# Patient Record
Sex: FEMALE | Race: WHITE | NOT HISPANIC OR LATINO | URBAN - METROPOLITAN AREA
[De-identification: names, ages, dates, MRNs, and addresses within clinical notes are randomized per-mention and may not be internally consistent; named-entity substitution may affect disease eponyms.]

---

## 2015-08-24 RX ORDER — METOPROLOL TARTRATE 50 MG
0.5 TABLET ORAL
Qty: 0 | Refills: 0 | COMMUNITY
Start: 2015-08-24

## 2015-08-24 RX ORDER — METOPROLOL TARTRATE 50 MG
1 TABLET ORAL
Qty: 0 | Refills: 0 | COMMUNITY
Start: 2015-08-24

## 2017-01-04 ENCOUNTER — INPATIENT (INPATIENT)
Facility: HOSPITAL | Age: 82
LOS: 3 days | Discharge: HOME | DRG: 291 | End: 2017-01-08
Attending: INTERNAL MEDICINE | Admitting: INTERNAL MEDICINE
Payer: MEDICARE

## 2017-01-04 VITALS
SYSTOLIC BLOOD PRESSURE: 163 MMHG | OXYGEN SATURATION: 91 % | HEART RATE: 76 BPM | HEIGHT: 62 IN | RESPIRATION RATE: 31 BRPM | WEIGHT: 110.01 LBS | DIASTOLIC BLOOD PRESSURE: 83 MMHG

## 2017-01-04 DIAGNOSIS — I48.0 PAROXYSMAL ATRIAL FIBRILLATION: ICD-10-CM

## 2017-01-04 DIAGNOSIS — I50.9 HEART FAILURE, UNSPECIFIED: ICD-10-CM

## 2017-01-04 DIAGNOSIS — Z41.8 ENCOUNTER FOR OTHER PROCEDURES FOR PURPOSES OTHER THAN REMEDYING HEALTH STATE: ICD-10-CM

## 2017-01-04 DIAGNOSIS — J81.0 ACUTE PULMONARY EDEMA: ICD-10-CM

## 2017-01-04 DIAGNOSIS — I25.10 ATHEROSCLEROTIC HEART DISEASE OF NATIVE CORONARY ARTERY WITHOUT ANGINA PECTORIS: ICD-10-CM

## 2017-01-04 DIAGNOSIS — I10 ESSENTIAL (PRIMARY) HYPERTENSION: ICD-10-CM

## 2017-01-04 DIAGNOSIS — N18.3 CHRONIC KIDNEY DISEASE, STAGE 3 (MODERATE): ICD-10-CM

## 2017-01-04 LAB
ALBUMIN SERPL ELPH-MCNC: 3.5 G/DL — SIGNIFICANT CHANGE UP (ref 3.5–5)
ALP SERPL-CCNC: 112 U/L — SIGNIFICANT CHANGE UP (ref 40–120)
ALT FLD-CCNC: 39 U/L DA — SIGNIFICANT CHANGE UP (ref 10–60)
ANION GAP SERPL CALC-SCNC: 10 MMOL/L — SIGNIFICANT CHANGE UP (ref 5–17)
APPEARANCE UR: CLEAR — SIGNIFICANT CHANGE UP
APTT BLD: 29.4 SEC — SIGNIFICANT CHANGE UP (ref 27.5–37.4)
AST SERPL-CCNC: 22 U/L — SIGNIFICANT CHANGE UP (ref 10–40)
BASE EXCESS BLDA CALC-SCNC: -2.6 MMOL/L — LOW (ref -2–2)
BASOPHILS # BLD AUTO: 0.1 K/UL — SIGNIFICANT CHANGE UP (ref 0–0.2)
BASOPHILS NFR BLD AUTO: 0.5 % — SIGNIFICANT CHANGE UP (ref 0–2)
BILIRUB SERPL-MCNC: 0.4 MG/DL — SIGNIFICANT CHANGE UP (ref 0.2–1.2)
BILIRUB UR-MCNC: NEGATIVE — SIGNIFICANT CHANGE UP
BLOOD GAS COMMENTS ARTERIAL: SIGNIFICANT CHANGE UP
BUN SERPL-MCNC: 49 MG/DL — HIGH (ref 7–18)
CALCIUM SERPL-MCNC: 9 MG/DL — SIGNIFICANT CHANGE UP (ref 8.4–10.5)
CHLORIDE SERPL-SCNC: 109 MMOL/L — HIGH (ref 96–108)
CK MB BLD-MCNC: 3 % — SIGNIFICANT CHANGE UP (ref 0–3.5)
CK MB BLD-MCNC: <1.5 % — SIGNIFICANT CHANGE UP (ref 0–3.5)
CK MB CFR SERPL CALC: 2.1 NG/ML — SIGNIFICANT CHANGE UP (ref 0–3.6)
CK MB CFR SERPL CALC: <1 NG/ML — SIGNIFICANT CHANGE UP (ref 0–3.6)
CK SERPL-CCNC: 66 U/L — SIGNIFICANT CHANGE UP (ref 21–215)
CK SERPL-CCNC: 70 U/L — SIGNIFICANT CHANGE UP (ref 21–215)
CO2 SERPL-SCNC: 25 MMOL/L — SIGNIFICANT CHANGE UP (ref 22–31)
COLOR SPEC: YELLOW — SIGNIFICANT CHANGE UP
CREAT SERPL-MCNC: 2.11 MG/DL — HIGH (ref 0.5–1.3)
DIFF PNL FLD: NEGATIVE — SIGNIFICANT CHANGE UP
EOSINOPHIL # BLD AUTO: 0.3 K/UL — SIGNIFICANT CHANGE UP (ref 0–0.5)
EOSINOPHIL NFR BLD AUTO: 1.7 % — SIGNIFICANT CHANGE UP (ref 0–6)
GLUCOSE SERPL-MCNC: 237 MG/DL — HIGH (ref 70–99)
GLUCOSE UR QL: NEGATIVE — SIGNIFICANT CHANGE UP
HBA1C BLD-MCNC: 5.9 % — HIGH (ref 4–5.6)
HCO3 BLDA-SCNC: 23 MMOL/L — SIGNIFICANT CHANGE UP (ref 23–27)
HCT VFR BLD CALC: 44.9 % — SIGNIFICANT CHANGE UP (ref 34.5–45)
HGB BLD-MCNC: 13.6 G/DL — SIGNIFICANT CHANGE UP (ref 11.5–15.5)
HOROWITZ INDEX BLDA+IHG-RTO: SIGNIFICANT CHANGE UP
INR BLD: 0.94 RATIO — SIGNIFICANT CHANGE UP (ref 0.88–1.16)
KETONES UR-MCNC: NEGATIVE — SIGNIFICANT CHANGE UP
LEUKOCYTE ESTERASE UR-ACNC: NEGATIVE — SIGNIFICANT CHANGE UP
LYMPHOCYTES # BLD AUTO: 28 % — SIGNIFICANT CHANGE UP (ref 13–44)
LYMPHOCYTES # BLD AUTO: 4.7 K/UL — HIGH (ref 1–3.3)
MCHC RBC-ENTMCNC: 29.3 PG — SIGNIFICANT CHANGE UP (ref 27–34)
MCHC RBC-ENTMCNC: 30.4 GM/DL — LOW (ref 32–36)
MCV RBC AUTO: 96.4 FL — SIGNIFICANT CHANGE UP (ref 80–100)
MONOCYTES # BLD AUTO: 0.8 K/UL — SIGNIFICANT CHANGE UP (ref 0–0.9)
MONOCYTES NFR BLD AUTO: 4.9 % — SIGNIFICANT CHANGE UP (ref 2–14)
NEUTROPHILS # BLD AUTO: 10.8 K/UL — HIGH (ref 1.8–7.4)
NEUTROPHILS NFR BLD AUTO: 64.9 % — SIGNIFICANT CHANGE UP (ref 43–77)
NITRITE UR-MCNC: NEGATIVE — SIGNIFICANT CHANGE UP
NT-PROBNP SERPL-SCNC: 5213 PG/ML — HIGH (ref 0–450)
PCO2 BLDA: 46 MMHG — SIGNIFICANT CHANGE UP (ref 32–46)
PH BLDA: 7.32 — LOW (ref 7.35–7.45)
PH UR: 5 — SIGNIFICANT CHANGE UP (ref 4.8–8)
PLATELET # BLD AUTO: 338 K/UL — SIGNIFICANT CHANGE UP (ref 150–400)
PO2 BLDA: 63 MMHG — LOW (ref 74–108)
POTASSIUM SERPL-MCNC: 3.3 MMOL/L — LOW (ref 3.5–5.3)
POTASSIUM SERPL-SCNC: 3.3 MMOL/L — LOW (ref 3.5–5.3)
PROT SERPL-MCNC: 8.2 G/DL — SIGNIFICANT CHANGE UP (ref 6–8.3)
PROT UR-MCNC: 15
PROTHROM AB SERPL-ACNC: 10.5 SEC — SIGNIFICANT CHANGE UP (ref 10–13.1)
RBC # BLD: 4.66 M/UL — SIGNIFICANT CHANGE UP (ref 3.8–5.2)
RBC # FLD: 15.7 % — HIGH (ref 10.3–14.5)
SAO2 % BLDA: 89 % — LOW (ref 92–96)
SODIUM SERPL-SCNC: 144 MMOL/L — SIGNIFICANT CHANGE UP (ref 135–145)
SP GR SPEC: 1.01 — SIGNIFICANT CHANGE UP (ref 1.01–1.02)
TROPONIN I SERPL-MCNC: 0.1 NG/ML — HIGH (ref 0–0.04)
TROPONIN I SERPL-MCNC: 0.18 NG/ML — HIGH (ref 0–0.04)
UROBILINOGEN FLD QL: NEGATIVE — SIGNIFICANT CHANGE UP
WBC # BLD: 16.7 K/UL — HIGH (ref 3.8–10.5)
WBC # FLD AUTO: 16.7 K/UL — HIGH (ref 3.8–10.5)

## 2017-01-04 PROCEDURE — 99291 CRITICAL CARE FIRST HOUR: CPT

## 2017-01-04 PROCEDURE — 71010: CPT | Mod: 26

## 2017-01-04 RX ORDER — CLOPIDOGREL BISULFATE 75 MG/1
1 TABLET, FILM COATED ORAL
Qty: 0 | Refills: 0 | COMMUNITY

## 2017-01-04 RX ORDER — INSULIN LISPRO 100/ML
VIAL (ML) SUBCUTANEOUS EVERY 6 HOURS
Qty: 0 | Refills: 0 | Status: DISCONTINUED | OUTPATIENT
Start: 2017-01-04 | End: 2017-01-04

## 2017-01-04 RX ORDER — HEPARIN SODIUM 5000 [USP'U]/ML
5000 INJECTION INTRAVENOUS; SUBCUTANEOUS EVERY 8 HOURS
Qty: 0 | Refills: 0 | Status: DISCONTINUED | OUTPATIENT
Start: 2017-01-04 | End: 2017-01-08

## 2017-01-04 RX ORDER — METOPROLOL TARTRATE 50 MG
25 TABLET ORAL THREE TIMES A DAY
Qty: 0 | Refills: 0 | Status: DISCONTINUED | OUTPATIENT
Start: 2017-01-04 | End: 2017-01-08

## 2017-01-04 RX ORDER — ATORVASTATIN CALCIUM 80 MG/1
40 TABLET, FILM COATED ORAL AT BEDTIME
Qty: 0 | Refills: 0 | Status: DISCONTINUED | OUTPATIENT
Start: 2017-01-04 | End: 2017-01-08

## 2017-01-04 RX ORDER — FUROSEMIDE 40 MG
80 TABLET ORAL ONCE
Qty: 0 | Refills: 0 | Status: COMPLETED | OUTPATIENT
Start: 2017-01-04 | End: 2017-01-04

## 2017-01-04 RX ORDER — ASPIRIN/CALCIUM CARB/MAGNESIUM 324 MG
81 TABLET ORAL DAILY
Qty: 0 | Refills: 0 | Status: DISCONTINUED | OUTPATIENT
Start: 2017-01-04 | End: 2017-01-08

## 2017-01-04 RX ORDER — CLOPIDOGREL BISULFATE 75 MG/1
75 TABLET, FILM COATED ORAL DAILY
Qty: 0 | Refills: 0 | Status: DISCONTINUED | OUTPATIENT
Start: 2017-01-04 | End: 2017-01-08

## 2017-01-04 RX ORDER — SODIUM CHLORIDE 9 MG/ML
3 INJECTION INTRAMUSCULAR; INTRAVENOUS; SUBCUTANEOUS ONCE
Qty: 0 | Refills: 0 | Status: COMPLETED | OUTPATIENT
Start: 2017-01-04 | End: 2017-01-04

## 2017-01-04 RX ORDER — POTASSIUM CHLORIDE 20 MEQ
10 PACKET (EA) ORAL
Qty: 0 | Refills: 0 | Status: DISCONTINUED | OUTPATIENT
Start: 2017-01-04 | End: 2017-01-08

## 2017-01-04 RX ORDER — PANTOPRAZOLE SODIUM 20 MG/1
40 TABLET, DELAYED RELEASE ORAL
Qty: 0 | Refills: 0 | Status: DISCONTINUED | OUTPATIENT
Start: 2017-01-04 | End: 2017-01-08

## 2017-01-04 RX ORDER — AMIODARONE HYDROCHLORIDE 400 MG/1
200 TABLET ORAL DAILY
Qty: 0 | Refills: 0 | Status: DISCONTINUED | OUTPATIENT
Start: 2017-01-04 | End: 2017-01-08

## 2017-01-04 RX ORDER — PIPERACILLIN AND TAZOBACTAM 4; .5 G/20ML; G/20ML
3.38 INJECTION, POWDER, LYOPHILIZED, FOR SOLUTION INTRAVENOUS ONCE
Qty: 0 | Refills: 0 | Status: COMPLETED | OUTPATIENT
Start: 2017-01-04 | End: 2017-01-04

## 2017-01-04 RX ORDER — FUROSEMIDE 40 MG
40 TABLET ORAL EVERY 12 HOURS
Qty: 0 | Refills: 0 | Status: DISCONTINUED | OUTPATIENT
Start: 2017-01-04 | End: 2017-01-08

## 2017-01-04 RX ADMIN — Medication 25 MILLIGRAM(S): at 15:19

## 2017-01-04 RX ADMIN — PIPERACILLIN AND TAZOBACTAM 200 GRAM(S): 4; .5 INJECTION, POWDER, LYOPHILIZED, FOR SOLUTION INTRAVENOUS at 11:46

## 2017-01-04 RX ADMIN — Medication 81 MILLIGRAM(S): at 21:44

## 2017-01-04 RX ADMIN — ATORVASTATIN CALCIUM 40 MILLIGRAM(S): 80 TABLET, FILM COATED ORAL at 21:45

## 2017-01-04 RX ADMIN — AMIODARONE HYDROCHLORIDE 200 MILLIGRAM(S): 400 TABLET ORAL at 15:19

## 2017-01-04 RX ADMIN — Medication 80 MILLIGRAM(S): at 10:36

## 2017-01-04 RX ADMIN — Medication 40 MILLIGRAM(S): at 17:36

## 2017-01-04 RX ADMIN — HEPARIN SODIUM 5000 UNIT(S): 5000 INJECTION INTRAVENOUS; SUBCUTANEOUS at 21:45

## 2017-01-04 RX ADMIN — CLOPIDOGREL BISULFATE 75 MILLIGRAM(S): 75 TABLET, FILM COATED ORAL at 21:44

## 2017-01-04 RX ADMIN — SODIUM CHLORIDE 3 MILLILITER(S): 9 INJECTION INTRAMUSCULAR; INTRAVENOUS; SUBCUTANEOUS at 10:37

## 2017-01-04 RX ADMIN — PANTOPRAZOLE SODIUM 40 MILLIGRAM(S): 20 TABLET, DELAYED RELEASE ORAL at 21:44

## 2017-01-04 RX ADMIN — Medication 100 MILLIEQUIVALENT(S): at 15:19

## 2017-01-04 RX ADMIN — Medication 1.25 MILLIGRAM(S): at 10:36

## 2017-01-04 NOTE — ED PROVIDER NOTE - PMH
CAD (coronary artery disease)  s/p 4 stents  CAD (coronary artery disease)  S/p stent x 4 (remote)  CHF (congestive heart failure)  hfref  CHF, chronic  EF 40-45%  CKD (chronic kidney disease), stage 3 (moderate)    HTN (hypertension)    HTN (hypertension)    MI (myocardial infarction)    Osteoarthritis    Osteoarthritis    Paroxysmal atrial fibrillation    Paroxysmal atrial fibrillation

## 2017-01-04 NOTE — ED PROVIDER NOTE - PROGRESS NOTE DETAILS
Pt was given Lasix 80 mg IVP, enalapril 1.25 mg IVP, pt was placed on Nitro drip. Pt was also placed on Bipap. Pt improved on Bipap and Nitro drip. ICU and Dr. Carrillo was consulted. After ICU and Dr. Carrillo evalution, advised to down grade to Tele.

## 2017-01-04 NOTE — ED PROVIDER NOTE - MEDICAL DECISION MAKING DETAILS
Pt presents to the ED BIBA for respiratory distress. Respiratory team was called. Plan to obtain basic labs, UA, cultures, CXR, and troponin. Ollie treat with Lasix, Vasotec and reassess. Plan to admit

## 2017-01-04 NOTE — ED PROVIDER NOTE - NS ED MD SCRIBE ATTENDING SCRIBE SECTIONS
PHYSICAL EXAM/PAST MEDICAL/SURGICAL/SOCIAL HISTORY/HISTORY OF PRESENT ILLNESS/REVIEW OF SYSTEMS/DISPOSITION/VITAL SIGNS( Pullset)

## 2017-01-04 NOTE — H&P ADULT. - HISTORY OF PRESENT ILLNESS
Patient is an 84 year-old female from home (lives alone) with PMH of systolic heart failure, MI, CAD (s/p 5 stents, latest 11/2016), atrial fibrillation, CKD III, GERD who presents with sudden onset shortness of breath that began at 7:30am today. She denies previous symptoms. She denies headache, chest pain, nausea, vomiting, diarrhea, abdominal pain, recent illness, fevers. At baseline, she sleeps with 2 pillows and has chronic leg swelling. She was admitted to [facility] in November, 2016 for acute pulmonary edema for which she required intubation and ICU stay for acute pulmonary edema secondary to cardiac ischemia. During that admission, she was stabilized and sent to telemetry floor after which she was transferred to [facility] for cardiac catheterization. Stent was place din left circumflex artery. TTE at that time showed EF 20-25%. Life vest was not necessary as per primary cardiologist. Patient was amiodarone loaded for atrial fibrillation and started on ACEi.

## 2017-01-04 NOTE — H&P ADULT. - FAMILY HISTORY
No pertinent family history in first degree relatives     No pertinent family history in first degree relatives

## 2017-01-04 NOTE — ED ADULT NURSE REASSESSMENT NOTE - NS ED NURSE REASSESS COMMENT FT1
Patient received from SILVANO Thayer. Patient alert, responsive, resting in stretcher, on bipap. Bipap S/T settings, rate 12, O2 100%, IPAP 14cm H20. Patient on wall cardiac monitor, sinus bradycardia, pulse 48, MD aware. Patient states breathing is improved at this time. No use of accessory muscles. Skin is warm and dry. Awaiting disposition.

## 2017-01-04 NOTE — ED PROVIDER NOTE - CRITICAL CARE PROVIDED
interpretation of diagnostic studies/direct patient care (not related to procedure)/documentation/consultation with other physicians/additional history taking

## 2017-01-04 NOTE — H&P ADULT. - PROBLEM SELECTOR PLAN 1
Pt has SOB associated with chest pressure and palpitations and hypoxia likely sec to Acute pulm edema.  s/p lasix IV 40mg BID in ED, continue the same  T1 neg, f/u T2 and T3  Tele monitoring  r/o ACS given CP and palpitations  ASA, Statin, lopressor  pt placed on Bipap as hypoxic  Monitor I/O, Daily weights  f/u CXR in AM  F/U hba1c, lipids, TSH  Cardio Dr. Carrillo - patient has shortness of breath with associated chest pressure, hypoxia due to pulmonary edema secondary to acute on chronic systolic heart failure exacerbation, echocardiogram in 11/2016 showed EF 20-25%  - placed on BIPAP, ABG showed hypoxia at 100% FiO2; chest xray showed pulmonary edema   - s/p 80 mg IV lasix in the ED, dooley placed, patient diuresing well   - continue lasix IV 40mg twice daily  - patient has symptomatically improved and is saturating well, not tachypneic, will decrease FiO2 as tolerated   - monitor strict intake/output and daily weights   - given chest pressure, will rule out ACS; T1 0.103, follow up 2 more sets cardiac enzymes  - continue aspirin, statin, lopressor   - continue enalapril   - follow up repeat chest xray tomorrow morning   - cardiology- Dr. Carrillo

## 2017-01-04 NOTE — ED PROVIDER NOTE - OBJECTIVE STATEMENT
83 y/o F pt with PMHx of CHF, stents x 5 last stent 2 weeks ago and MI presents to the ED c/o SOB x 0800 today. Pt was in obvious respiratory distress when EMS arrived with an O2 stat of 65 on room air. Pt's O2 stat increased to 85 with CPAP in the field. Per EMS, pt took 2 nitros on own. Pt was last intubated 4 weeks ago. Unable to obtain further Hx from pt due to need for intervention. Pt has intolerance to Zithromax and azithromycin.

## 2017-01-04 NOTE — H&P ADULT. - ATTENDING COMMENTS
Assessment:  · Assessment	  Patient is an 84 year-old female from home with PMH of systolic heart failure, MI, CAD with 5 stents, atrial fibrillation, CKD III, GERD who presents with sudden onset shortness of breath that began at 7:30am today and is admitted to the MICU for acute pulmonary edema requiring BIPAP.       Problem/Plan - 1:  ·  Problem: Acute pulmonary edema.  Plan: - patient has shortness of breath with associated chest pressure, hypoxia due to pulmonary edema secondary to acute on chronic systolic heart failure exacerbation, echocardiogram in 11/2016 showed EF 20-25%  - placed on BIPAP, ABG showed hypoxia at 100% FiO2; chest xray showed pulmonary edema   - s/p 80 mg IV lasix in the ED, dooley placed, patient diuresing well   - continue lasix IV 40mg twice daily  - patient has symptomatically improved and is saturating well, not tachypneic, will decrease FiO2 as tolerated   - monitor strict intake/output and daily weights   - given chest pressure, will rule out ACS; T1 0.103, follow up 2 more sets cardiac enzymes  - continue aspirin, statin, lopressor   - continue enalapril   - follow up repeat chest xray tomorrow morning   - cardiology- Dr. Carrillo.     Problem/Plan - 2:  ·  Problem: Paroxysmal atrial fibrillation.  Plan: - continue amiodarone for rhythm control, lopressor for rate control   - no longer on xarelto as per primary cardiologist  - CHADS 2 Vasc 5+;  continue plavix and aspirin.     Problem/Plan - 3:  ·  Problem: CKD (chronic kidney disease), stage 3 (moderate).  Plan: - patient has CKD stage III, baseline creatinine 2; serum creatinine today is 2.11, not significantly changes from baseline  - monitor kidney function  - anticipate mild increase in creatinine due to IV lasix.     Problem/Plan - 4:  ·  Problem: Essential hypertension.  Plan: - continue enalapril, lopressor as per home medication regimen  - monitor BP.     Problem/Plan - 5:  ·  Problem: CAD (coronary artery disease).  Plan: - with 5 total stents, most recent in 11/2016  - continue aspirin, plavix, statin, beta blocker.     Problem/Plan - 6:  Problem: Need for prophylactic measure. Plan: - heparin subcutaneous.

## 2017-01-04 NOTE — ED ADULT NURSE NOTE - OBJECTIVE STATEMENT
pt bought in by the notification pt on the bipap pt has the distended jugular vein pt disphoretic pt has sob

## 2017-01-04 NOTE — H&P ADULT. - PROBLEM SELECTOR PLAN 4
pt has CKD stage 3 baseline 2, craetinine 2.11 close to baseline  monitor creatinine - continue enalapril, lopressor as per home medication regimen  - monitor BP

## 2017-01-04 NOTE — H&P ADULT. - PROBLEM SELECTOR PLAN 2
Pt has SOB associated with chest pressure and palpitations and hypoxia likely sec to acute on chronic combined CHF exacerbation  s/p lasix IV 40mg BID in ED, continue the same  T1 neg, f/u T2 and T3  Tele monitoring  r/o ACS given CP and palpitations  ASA, Statin  pt placed on Bipap as hypoxic  Monitor I/O, Daily weights  f/u CXR in AM  F/U hba1c, lipids, TSH  Cardio Dr. Carrillo  ECHO in Nov 2016 EF 20-25% - continue amiodarone for rhythm control, lopressor for rate control   - no longer on xarelto as per primary cardiologist  - CHADS 2 Vasc 5+;  continue plavix and aspirin

## 2017-01-04 NOTE — H&P ADULT. - PROBLEM SELECTOR PROBLEM 2
Congestive heart failure, unspecified congestive heart failure chronicity, unspecified congestive heart failure type Paroxysmal atrial fibrillation

## 2017-01-04 NOTE — H&P ADULT. - PROBLEM SELECTOR PLAN 5
Will give vasotec and lopressor  monitor bp - with 5 total stents, most recent in 11/2016  - continue aspirin, plavix, statin, beta blocker

## 2017-01-04 NOTE — H&P ADULT. - ASSESSMENT
Patient is an 84 year-old female from home (lives alone) with PMH of systolic heart failure, MI, CAD (s/p 5 stents, latest 11/2016), atrial fibrillation, CKD III, GERD who presents with sudden onset shortness of breath that began at 7:30am today. She denies previous symptoms. She denies headache, chest pain, nausea, vomiting, diarrhea, abdominal pain, recent illness, fevers. At baseline, she sleeps with 2 pillows and has chronic leg swelling. She was admitted to [facility] in November, 2016 for acute pulmonary edema for which she required intubation and ICU stay for acute pulmonary edema secondary to cardiac ischemia. During that admission, she was stabilized and sent to telemetry floor after which she was transferred to [facility] for cardiac catheterization. Stent was place din left circumflex artery. TTE at that time showed EF 20-25%. Life vest was not necessary as per primary cardiologist. Patient was amiodarone loaded for atrial fibrillation and started on ACEi. Patient is an 84 year-old female from home with PMH of systolic heart failure, MI, CAD with 5 stents, atrial fibrillation, CKD III, GERD who presents with sudden onset shortness of breath that began at 7:30am today and is admitted to the MICU for acute pulmonary edema requiring BIPAP.

## 2017-01-04 NOTE — H&P ADULT. - PROBLEM SELECTOR PLAN 3
Rate controlled on amiodarone and metoprolol  not on any AC  continue plavix and ASA - patient has CKD stage III, baseline creatinine 2; serum creatinine today is 2.11, not significantly changes from baseline  - monitor kidney function  - expect mild increase in creatinine due to IV lasix - patient has CKD stage III, baseline creatinine 2; serum creatinine today is 2.11, not significantly changes from baseline  - monitor kidney function  - anticipate mild increase in creatinine due to IV lasix

## 2017-01-05 LAB
ANION GAP SERPL CALC-SCNC: 10 MMOL/L — SIGNIFICANT CHANGE UP (ref 5–17)
ANION GAP SERPL CALC-SCNC: 11 MMOL/L — SIGNIFICANT CHANGE UP (ref 5–17)
BASOPHILS # BLD AUTO: 0.1 K/UL — SIGNIFICANT CHANGE UP (ref 0–0.2)
BASOPHILS NFR BLD AUTO: 0.5 % — SIGNIFICANT CHANGE UP (ref 0–2)
BUN SERPL-MCNC: 39 MG/DL — HIGH (ref 7–18)
BUN SERPL-MCNC: 42 MG/DL — HIGH (ref 7–18)
CALCIUM SERPL-MCNC: 8.6 MG/DL — SIGNIFICANT CHANGE UP (ref 8.4–10.5)
CALCIUM SERPL-MCNC: 8.9 MG/DL — SIGNIFICANT CHANGE UP (ref 8.4–10.5)
CHLORIDE SERPL-SCNC: 107 MMOL/L — SIGNIFICANT CHANGE UP (ref 96–108)
CHLORIDE SERPL-SCNC: 111 MMOL/L — HIGH (ref 96–108)
CHOLEST SERPL-MCNC: <50 MG/DL — SIGNIFICANT CHANGE UP (ref 10–199)
CO2 SERPL-SCNC: 26 MMOL/L — SIGNIFICANT CHANGE UP (ref 22–31)
CO2 SERPL-SCNC: 27 MMOL/L — SIGNIFICANT CHANGE UP (ref 22–31)
CREAT SERPL-MCNC: 1.39 MG/DL — HIGH (ref 0.5–1.3)
CREAT SERPL-MCNC: 1.41 MG/DL — HIGH (ref 0.5–1.3)
CULTURE RESULTS: NO GROWTH — SIGNIFICANT CHANGE UP
EOSINOPHIL # BLD AUTO: 0.1 K/UL — SIGNIFICANT CHANGE UP (ref 0–0.5)
EOSINOPHIL NFR BLD AUTO: 0.7 % — SIGNIFICANT CHANGE UP (ref 0–6)
GLUCOSE SERPL-MCNC: 117 MG/DL — HIGH (ref 70–99)
GLUCOSE SERPL-MCNC: 91 MG/DL — SIGNIFICANT CHANGE UP (ref 70–99)
HCT VFR BLD CALC: 40.2 % — SIGNIFICANT CHANGE UP (ref 34.5–45)
HDLC SERPL-MCNC: <10 MG/DL — LOW (ref 40–125)
HGB BLD-MCNC: 12.4 G/DL — SIGNIFICANT CHANGE UP (ref 11.5–15.5)
LIPID PNL WITH DIRECT LDL SERPL: SIGNIFICANT CHANGE UP MG/DL
LYMPHOCYTES # BLD AUTO: 1.3 K/UL — SIGNIFICANT CHANGE UP (ref 1–3.3)
LYMPHOCYTES # BLD AUTO: 13.3 % — SIGNIFICANT CHANGE UP (ref 13–44)
MAGNESIUM SERPL-MCNC: 2.4 MG/DL — SIGNIFICANT CHANGE UP (ref 1.8–2.4)
MCHC RBC-ENTMCNC: 29.2 PG — SIGNIFICANT CHANGE UP (ref 27–34)
MCHC RBC-ENTMCNC: 31 GM/DL — LOW (ref 32–36)
MCV RBC AUTO: 94.3 FL — SIGNIFICANT CHANGE UP (ref 80–100)
MONOCYTES # BLD AUTO: 0.9 K/UL — SIGNIFICANT CHANGE UP (ref 0–0.9)
MONOCYTES NFR BLD AUTO: 8.9 % — SIGNIFICANT CHANGE UP (ref 2–14)
NEUTROPHILS # BLD AUTO: 7.4 K/UL — SIGNIFICANT CHANGE UP (ref 1.8–7.4)
NEUTROPHILS NFR BLD AUTO: 76.6 % — SIGNIFICANT CHANGE UP (ref 43–77)
PHOSPHATE SERPL-MCNC: 3.8 MG/DL — SIGNIFICANT CHANGE UP (ref 2.5–4.5)
PLATELET # BLD AUTO: 191 K/UL — SIGNIFICANT CHANGE UP (ref 150–400)
POTASSIUM SERPL-MCNC: 3.4 MMOL/L — LOW (ref 3.5–5.3)
POTASSIUM SERPL-MCNC: 4.3 MMOL/L — SIGNIFICANT CHANGE UP (ref 3.5–5.3)
POTASSIUM SERPL-SCNC: 3.4 MMOL/L — LOW (ref 3.5–5.3)
POTASSIUM SERPL-SCNC: 4.3 MMOL/L — SIGNIFICANT CHANGE UP (ref 3.5–5.3)
RBC # BLD: 4.27 M/UL — SIGNIFICANT CHANGE UP (ref 3.8–5.2)
RBC # FLD: 15.4 % — HIGH (ref 10.3–14.5)
SODIUM SERPL-SCNC: 144 MMOL/L — SIGNIFICANT CHANGE UP (ref 135–145)
SODIUM SERPL-SCNC: 148 MMOL/L — HIGH (ref 135–145)
SPECIMEN SOURCE: SIGNIFICANT CHANGE UP
TOTAL CHOLESTEROL/HDL RATIO MEASUREMENT: SIGNIFICANT CHANGE UP RATIO (ref 3.3–7.1)
TRIGL SERPL-MCNC: 118 MG/DL — SIGNIFICANT CHANGE UP (ref 10–149)
TROPONIN I SERPL-MCNC: 0.15 NG/ML — HIGH (ref 0–0.04)
WBC # BLD: 9.6 K/UL — SIGNIFICANT CHANGE UP (ref 3.8–10.5)
WBC # FLD AUTO: 9.6 K/UL — SIGNIFICANT CHANGE UP (ref 3.8–10.5)

## 2017-01-05 PROCEDURE — 71010: CPT | Mod: 26

## 2017-01-05 RX ORDER — POTASSIUM CHLORIDE 20 MEQ
40 PACKET (EA) ORAL ONCE
Qty: 0 | Refills: 0 | Status: COMPLETED | OUTPATIENT
Start: 2017-01-05 | End: 2017-01-05

## 2017-01-05 RX ORDER — SENNA PLUS 8.6 MG/1
1 TABLET ORAL AT BEDTIME
Qty: 0 | Refills: 0 | Status: DISCONTINUED | OUTPATIENT
Start: 2017-01-05 | End: 2017-01-08

## 2017-01-05 RX ORDER — ACETAMINOPHEN 500 MG
650 TABLET ORAL ONCE
Qty: 0 | Refills: 0 | Status: COMPLETED | OUTPATIENT
Start: 2017-01-05 | End: 2017-01-05

## 2017-01-05 RX ORDER — HYDROCORTISONE 1 %
1 OINTMENT (GRAM) TOPICAL DAILY
Qty: 0 | Refills: 0 | Status: COMPLETED | OUTPATIENT
Start: 2017-01-05 | End: 2017-01-08

## 2017-01-05 RX ORDER — POTASSIUM CHLORIDE 20 MEQ
10 PACKET (EA) ORAL
Qty: 0 | Refills: 0 | Status: DISCONTINUED | OUTPATIENT
Start: 2017-01-05 | End: 2017-01-05

## 2017-01-05 RX ORDER — DOCUSATE SODIUM 100 MG
100 CAPSULE ORAL
Qty: 0 | Refills: 0 | Status: DISCONTINUED | OUTPATIENT
Start: 2017-01-05 | End: 2017-01-08

## 2017-01-05 RX ADMIN — CLOPIDOGREL BISULFATE 75 MILLIGRAM(S): 75 TABLET, FILM COATED ORAL at 11:47

## 2017-01-05 RX ADMIN — PANTOPRAZOLE SODIUM 40 MILLIGRAM(S): 20 TABLET, DELAYED RELEASE ORAL at 05:39

## 2017-01-05 RX ADMIN — ATORVASTATIN CALCIUM 40 MILLIGRAM(S): 80 TABLET, FILM COATED ORAL at 21:56

## 2017-01-05 RX ADMIN — Medication 100 MILLIGRAM(S): at 18:52

## 2017-01-05 RX ADMIN — Medication 650 MILLIGRAM(S): at 20:47

## 2017-01-05 RX ADMIN — Medication 25 MILLIGRAM(S): at 18:52

## 2017-01-05 RX ADMIN — Medication 81 MILLIGRAM(S): at 11:47

## 2017-01-05 RX ADMIN — HEPARIN SODIUM 5000 UNIT(S): 5000 INJECTION INTRAVENOUS; SUBCUTANEOUS at 21:57

## 2017-01-05 RX ADMIN — SENNA PLUS 1 TABLET(S): 8.6 TABLET ORAL at 21:56

## 2017-01-05 RX ADMIN — Medication 25 MILLIGRAM(S): at 21:56

## 2017-01-05 RX ADMIN — Medication 40 MILLIEQUIVALENT(S): at 09:20

## 2017-01-05 RX ADMIN — HEPARIN SODIUM 5000 UNIT(S): 5000 INJECTION INTRAVENOUS; SUBCUTANEOUS at 05:39

## 2017-01-05 RX ADMIN — Medication 40 MILLIGRAM(S): at 18:52

## 2017-01-05 RX ADMIN — Medication 40 MILLIGRAM(S): at 05:40

## 2017-01-05 RX ADMIN — Medication 650 MILLIGRAM(S): at 21:52

## 2017-01-06 LAB
ANION GAP SERPL CALC-SCNC: 10 MMOL/L — SIGNIFICANT CHANGE UP (ref 5–17)
BUN SERPL-MCNC: 38 MG/DL — HIGH (ref 7–18)
CALCIUM SERPL-MCNC: 8.8 MG/DL — SIGNIFICANT CHANGE UP (ref 8.4–10.5)
CHLORIDE SERPL-SCNC: 107 MMOL/L — SIGNIFICANT CHANGE UP (ref 96–108)
CO2 SERPL-SCNC: 27 MMOL/L — SIGNIFICANT CHANGE UP (ref 22–31)
CREAT SERPL-MCNC: 1.48 MG/DL — HIGH (ref 0.5–1.3)
GLUCOSE SERPL-MCNC: 138 MG/DL — HIGH (ref 70–99)
HCT VFR BLD CALC: 36.3 % — SIGNIFICANT CHANGE UP (ref 34.5–45)
HGB BLD-MCNC: 11.5 G/DL — SIGNIFICANT CHANGE UP (ref 11.5–15.5)
MAGNESIUM SERPL-MCNC: 2.5 MG/DL — HIGH (ref 1.8–2.4)
MCHC RBC-ENTMCNC: 29.7 PG — SIGNIFICANT CHANGE UP (ref 27–34)
MCHC RBC-ENTMCNC: 31.7 GM/DL — LOW (ref 32–36)
MCV RBC AUTO: 93.8 FL — SIGNIFICANT CHANGE UP (ref 80–100)
PHOSPHATE SERPL-MCNC: 1.9 MG/DL — LOW (ref 2.5–4.5)
PLATELET # BLD AUTO: 213 K/UL — SIGNIFICANT CHANGE UP (ref 150–400)
POTASSIUM SERPL-MCNC: 3.6 MMOL/L — SIGNIFICANT CHANGE UP (ref 3.5–5.3)
POTASSIUM SERPL-SCNC: 3.6 MMOL/L — SIGNIFICANT CHANGE UP (ref 3.5–5.3)
RBC # BLD: 3.87 M/UL — SIGNIFICANT CHANGE UP (ref 3.8–5.2)
RBC # FLD: 15.3 % — HIGH (ref 10.3–14.5)
SODIUM SERPL-SCNC: 144 MMOL/L — SIGNIFICANT CHANGE UP (ref 135–145)
WBC # BLD: 10.2 K/UL — SIGNIFICANT CHANGE UP (ref 3.8–10.5)
WBC # FLD AUTO: 10.2 K/UL — SIGNIFICANT CHANGE UP (ref 3.8–10.5)

## 2017-01-06 PROCEDURE — 71010: CPT | Mod: 26

## 2017-01-06 RX ORDER — ACETAMINOPHEN 500 MG
650 TABLET ORAL THREE TIMES A DAY
Qty: 0 | Refills: 0 | Status: DISCONTINUED | OUTPATIENT
Start: 2017-01-06 | End: 2017-01-06

## 2017-01-06 RX ORDER — ACETAMINOPHEN 500 MG
650 TABLET ORAL EVERY 6 HOURS
Qty: 0 | Refills: 0 | Status: DISCONTINUED | OUTPATIENT
Start: 2017-01-06 | End: 2017-01-08

## 2017-01-06 RX ORDER — SPIRONOLACTONE 25 MG/1
25 TABLET, FILM COATED ORAL DAILY
Qty: 0 | Refills: 0 | Status: DISCONTINUED | OUTPATIENT
Start: 2017-01-06 | End: 2017-01-08

## 2017-01-06 RX ORDER — SODIUM,POTASSIUM PHOSPHATES 278-250MG
1 POWDER IN PACKET (EA) ORAL
Qty: 0 | Refills: 0 | Status: DISCONTINUED | OUTPATIENT
Start: 2017-01-06 | End: 2017-01-08

## 2017-01-06 RX ORDER — POTASSIUM PHOSPHATE, MONOBASIC POTASSIUM PHOSPHATE, DIBASIC 236; 224 MG/ML; MG/ML
15 INJECTION, SOLUTION INTRAVENOUS ONCE
Qty: 0 | Refills: 0 | Status: COMPLETED | OUTPATIENT
Start: 2017-01-06 | End: 2017-01-06

## 2017-01-06 RX ADMIN — Medication 40 MILLIGRAM(S): at 06:47

## 2017-01-06 RX ADMIN — HEPARIN SODIUM 5000 UNIT(S): 5000 INJECTION INTRAVENOUS; SUBCUTANEOUS at 13:08

## 2017-01-06 RX ADMIN — Medication 1 TABLET(S): at 22:22

## 2017-01-06 RX ADMIN — Medication 25 MILLIGRAM(S): at 13:08

## 2017-01-06 RX ADMIN — Medication 1 TABLET(S): at 17:21

## 2017-01-06 RX ADMIN — AMIODARONE HYDROCHLORIDE 200 MILLIGRAM(S): 400 TABLET ORAL at 06:47

## 2017-01-06 RX ADMIN — Medication 25 MILLIGRAM(S): at 22:22

## 2017-01-06 RX ADMIN — Medication 81 MILLIGRAM(S): at 12:40

## 2017-01-06 RX ADMIN — SENNA PLUS 1 TABLET(S): 8.6 TABLET ORAL at 22:22

## 2017-01-06 RX ADMIN — ATORVASTATIN CALCIUM 40 MILLIGRAM(S): 80 TABLET, FILM COATED ORAL at 22:22

## 2017-01-06 RX ADMIN — Medication 650 MILLIGRAM(S): at 22:25

## 2017-01-06 RX ADMIN — Medication 100 MILLIGRAM(S): at 06:47

## 2017-01-06 RX ADMIN — Medication 100 MILLIGRAM(S): at 17:21

## 2017-01-06 RX ADMIN — HEPARIN SODIUM 5000 UNIT(S): 5000 INJECTION INTRAVENOUS; SUBCUTANEOUS at 06:47

## 2017-01-06 RX ADMIN — Medication 1 APPLICATION(S): at 12:40

## 2017-01-06 RX ADMIN — Medication 40 MILLIGRAM(S): at 17:20

## 2017-01-06 RX ADMIN — POTASSIUM PHOSPHATE, MONOBASIC POTASSIUM PHOSPHATE, DIBASIC 62.5 MILLIMOLE(S): 236; 224 INJECTION, SOLUTION INTRAVENOUS at 12:39

## 2017-01-06 RX ADMIN — HEPARIN SODIUM 5000 UNIT(S): 5000 INJECTION INTRAVENOUS; SUBCUTANEOUS at 22:22

## 2017-01-06 RX ADMIN — CLOPIDOGREL BISULFATE 75 MILLIGRAM(S): 75 TABLET, FILM COATED ORAL at 12:40

## 2017-01-06 RX ADMIN — PANTOPRAZOLE SODIUM 40 MILLIGRAM(S): 20 TABLET, DELAYED RELEASE ORAL at 06:47

## 2017-01-06 RX ADMIN — Medication 650 MILLIGRAM(S): at 23:13

## 2017-01-07 ENCOUNTER — TRANSCRIPTION ENCOUNTER (OUTPATIENT)
Age: 82
End: 2017-01-07

## 2017-01-07 LAB
ANION GAP SERPL CALC-SCNC: 10 MMOL/L — SIGNIFICANT CHANGE UP (ref 5–17)
BUN SERPL-MCNC: 41 MG/DL — HIGH (ref 7–18)
CALCIUM SERPL-MCNC: 8.4 MG/DL — SIGNIFICANT CHANGE UP (ref 8.4–10.5)
CHLORIDE SERPL-SCNC: 107 MMOL/L — SIGNIFICANT CHANGE UP (ref 96–108)
CO2 SERPL-SCNC: 26 MMOL/L — SIGNIFICANT CHANGE UP (ref 22–31)
CREAT SERPL-MCNC: 1.6 MG/DL — HIGH (ref 0.5–1.3)
GLUCOSE SERPL-MCNC: 97 MG/DL — SIGNIFICANT CHANGE UP (ref 70–99)
MAGNESIUM SERPL-MCNC: 2.5 MG/DL — HIGH (ref 1.8–2.4)
PHOSPHATE SERPL-MCNC: 4.7 MG/DL — HIGH (ref 2.5–4.5)
POTASSIUM SERPL-MCNC: 3.9 MMOL/L — SIGNIFICANT CHANGE UP (ref 3.5–5.3)
POTASSIUM SERPL-SCNC: 3.9 MMOL/L — SIGNIFICANT CHANGE UP (ref 3.5–5.3)
SODIUM SERPL-SCNC: 143 MMOL/L — SIGNIFICANT CHANGE UP (ref 135–145)

## 2017-01-07 RX ORDER — SPIRONOLACTONE 25 MG/1
1 TABLET, FILM COATED ORAL
Qty: 30 | Refills: 0 | OUTPATIENT
Start: 2017-01-07 | End: 2017-02-06

## 2017-01-07 RX ADMIN — Medication 1 TABLET(S): at 13:00

## 2017-01-07 RX ADMIN — Medication 25 MILLIGRAM(S): at 06:08

## 2017-01-07 RX ADMIN — SENNA PLUS 1 TABLET(S): 8.6 TABLET ORAL at 21:31

## 2017-01-07 RX ADMIN — Medication 100 MILLIGRAM(S): at 06:08

## 2017-01-07 RX ADMIN — Medication 1 TABLET(S): at 21:31

## 2017-01-07 RX ADMIN — CLOPIDOGREL BISULFATE 75 MILLIGRAM(S): 75 TABLET, FILM COATED ORAL at 13:00

## 2017-01-07 RX ADMIN — Medication 1 TABLET(S): at 08:23

## 2017-01-07 RX ADMIN — Medication 81 MILLIGRAM(S): at 13:00

## 2017-01-07 RX ADMIN — Medication 2.5 MILLIGRAM(S): at 06:08

## 2017-01-07 RX ADMIN — Medication 100 MILLIGRAM(S): at 18:10

## 2017-01-07 RX ADMIN — Medication 40 MILLIGRAM(S): at 06:08

## 2017-01-07 RX ADMIN — PANTOPRAZOLE SODIUM 40 MILLIGRAM(S): 20 TABLET, DELAYED RELEASE ORAL at 06:08

## 2017-01-07 RX ADMIN — Medication 1 APPLICATION(S): at 15:50

## 2017-01-07 RX ADMIN — SPIRONOLACTONE 25 MILLIGRAM(S): 25 TABLET, FILM COATED ORAL at 06:08

## 2017-01-07 RX ADMIN — HEPARIN SODIUM 5000 UNIT(S): 5000 INJECTION INTRAVENOUS; SUBCUTANEOUS at 21:32

## 2017-01-07 RX ADMIN — Medication 1 TABLET(S): at 18:10

## 2017-01-07 RX ADMIN — HEPARIN SODIUM 5000 UNIT(S): 5000 INJECTION INTRAVENOUS; SUBCUTANEOUS at 06:08

## 2017-01-07 RX ADMIN — HEPARIN SODIUM 5000 UNIT(S): 5000 INJECTION INTRAVENOUS; SUBCUTANEOUS at 15:51

## 2017-01-07 RX ADMIN — AMIODARONE HYDROCHLORIDE 200 MILLIGRAM(S): 400 TABLET ORAL at 06:08

## 2017-01-07 RX ADMIN — ATORVASTATIN CALCIUM 40 MILLIGRAM(S): 80 TABLET, FILM COATED ORAL at 21:31

## 2017-01-07 NOTE — DISCHARGE NOTE ADULT - MEDICATION SUMMARY - MEDICATIONS TO STOP TAKING
I will STOP taking the medications listed below when I get home from the hospital:    apixaban 2.5 mg oral tablet  -- 1 tab(s) by mouth 2 times a day    digoxin 125 mcg (0.125 mg) oral tablet  -- 1 tab(s) by mouth once a day    rivaroxaban 15 mg oral tablet  -- 1 tab(s) by mouth once a day

## 2017-01-07 NOTE — DISCHARGE NOTE ADULT - NS AS DC HF EDUCATION INSTRUCTIONS
Monitor Weight Daily/Report weight gain of 2 or more pounds in one day or 3 or more pounds in one week, worsening shortness of breath, fatigue, weakness, increased swelling of hands and feet to primary care provider/Call Primary Care Provider for follow-up after discharge/Low salt diet/Activities as tolerated

## 2017-01-07 NOTE — DISCHARGE NOTE ADULT - SECONDARY DIAGNOSIS.
Acute pulmonary edema Essential hypertension CKD (chronic kidney disease), stage 3 (moderate) CAD (coronary artery disease) Paroxysmal atrial fibrillation

## 2017-01-07 NOTE — DISCHARGE NOTE ADULT - PLAN OF CARE
resolved continue lasix, aldactone and enalapril and metoprolol as prescribed. follow up with cardiologist in a week. DASH diet and moderate exercise. continue medication as above continue medication as above. BP monitor daily. follow up PCP in a week continue statin , aspirin and lopressor. continue amiodarone, metoprolol , aspirin and Plavix. Patient refused anticoagulation. follow up with nephrologist and monitor BMP in 1 week with PCP.

## 2017-01-07 NOTE — DISCHARGE NOTE ADULT - CARE PLAN
Principal Discharge DX:	CHF (congestive heart failure)  Goal:	resolved  Instructions for follow-up, activity and diet:	continue lasix, aldactone and enalapril and metoprolol as prescribed. follow up with cardiologist in a week. DASH diet and moderate exercise.  Secondary Diagnosis:	Acute pulmonary edema  Goal:	resolved  Instructions for follow-up, activity and diet:	continue medication as above  Secondary Diagnosis:	Essential hypertension  Instructions for follow-up, activity and diet:	continue medication as above. BP monitor daily. follow up PCP in a week  Secondary Diagnosis:	CKD (chronic kidney disease), stage 3 (moderate)  Instructions for follow-up, activity and diet:	follow up with nephrologist and monitor BMP in 1 week with PCP.  Secondary Diagnosis:	CAD (coronary artery disease)  Instructions for follow-up, activity and diet:	continue statin , aspirin and lopressor.  Secondary Diagnosis:	Paroxysmal atrial fibrillation  Instructions for follow-up, activity and diet:	continue amiodarone, metoprolol , aspirin and Plavix. Patient refused anticoagulation.

## 2017-01-07 NOTE — DISCHARGE NOTE ADULT - HOSPITAL COURSE
Patient is an 84 year-old female from home (lives alone) with PMH of systolic heart failure, MI, CAD (s/p 5 stents, latest 11/2016), atrial fibrillation, CKD III, GERD who presents with sudden onset shortness of breath that began at 7:30am today. She denies previous symptoms. She denies headache, chest pain, nausea, vomiting, diarrhea, abdominal pain, recent illness, fevers. At baseline, she sleeps with 2 pillows and has chronic leg swelling. She was admitted to [facility] in November, 2016 for acute pulmonary edema for which she required intubation and ICU stay for acute pulmonary edema secondary to cardiac ischemia. During that admission, she was stabilized and sent to telemetry floor after which she was transferred to [facility] for cardiac catheterization. Stent was place din left circumflex artery. TTE at that time showed EF 20-25%. Life vest was not necessary as per primary cardiologist. Patient was amiodarone loaded for atrial fibrillation and started on ACEI.    Admitted to ICU for acute pulmonary edema and respiratory failure with CXR evident. Echo in november with EF 20-25%. No need to repeat echo. Controlled with high dose IV lasix 80mg 1 dose and continue IV Lasix 40 mg daily inpatient. Respiratory failure resolved and patient diurese well. P.afib with rate control on amiodarone, metoprolol and aspirin and Plavix since patient refused anticoagulation. CKD with watchful monitoring. Patient HTN was well controlled with current medication. CAD on aspirin, statin, Plavix and metoprolol. Discussed with attending and patient was medically stable to go home.

## 2017-01-07 NOTE — DISCHARGE NOTE ADULT - PATIENT PORTAL LINK FT
“You can access the [employer] Patient Portal, offered by [facility], by registering with the following website: [email or web address]”

## 2017-01-07 NOTE — DISCHARGE NOTE ADULT - MEDICATION SUMMARY - MEDICATIONS TO TAKE
I will START or STAY ON the medications listed below when I get home from the hospital:    spironolactone 25 mg oral tablet  -- 1 tab(s) by mouth once a day  -- Indication: For CHF (congestive heart failure)    aspirin 81 mg oral tablet, chewable  -- 1 tab(s) by mouth once a day  -- Indication: For CAD (coronary artery disease)    enalapril 2.5 mg oral tablet  -- 1 tab(s) by mouth once a day  -- Indication: For CHF (congestive heart failure)    amiodarone 200 mg oral tablet  -- 1 tab(s) by mouth once a day HOME HOSP  -- Indication: For Afib    PARoxetine 10 mg oral tablet  -- 1 tab(s) by mouth once a day  -- Indication: For depression    Lipitor 40 mg oral tablet  -- 1 tab(s) by mouth once a day  -- Indication: For HLD    clopidogrel 75 mg oral tablet  -- 1 tab(s) by mouth once a day HOME HOSP  -- Indication: For CAD (coronary artery disease)    metoprolol tartrate 25 mg oral tablet  -- 1 tab(s) by mouth 3 times a day  -- Indication: For CHF (congestive heart failure)    Lasix 40 mg oral tablet  -- 1 tab(s) by mouth once a day  -- Indication: For CHF (congestive heart failure)    pantoprazole 40 mg oral delayed release tablet  -- 1 tab(s) by mouth once a day (before a meal) HOME HOSP  -- Indication: For Need for prophylactic measure

## 2017-01-07 NOTE — DISCHARGE NOTE ADULT - CARE PROVIDER_API CALL
Rivas Carrillo (SANDRA), Cardiology  [address] [address] [place] [address]  Phone: [phone]  Fax: [phone]

## 2017-01-08 VITALS
SYSTOLIC BLOOD PRESSURE: 109 MMHG | RESPIRATION RATE: 17 BRPM | TEMPERATURE: 98 F | DIASTOLIC BLOOD PRESSURE: 43 MMHG | OXYGEN SATURATION: 96 % | HEART RATE: 85 BPM

## 2017-01-08 LAB
ANION GAP SERPL CALC-SCNC: 13 MMOL/L — SIGNIFICANT CHANGE UP (ref 5–17)
BUN SERPL-MCNC: 47 MG/DL — HIGH (ref 7–18)
CALCIUM SERPL-MCNC: 8.4 MG/DL — SIGNIFICANT CHANGE UP (ref 8.4–10.5)
CHLORIDE SERPL-SCNC: 107 MMOL/L — SIGNIFICANT CHANGE UP (ref 96–108)
CO2 SERPL-SCNC: 25 MMOL/L — SIGNIFICANT CHANGE UP (ref 22–31)
CREAT SERPL-MCNC: 1.56 MG/DL — HIGH (ref 0.5–1.3)
GLUCOSE SERPL-MCNC: 103 MG/DL — HIGH (ref 70–99)
MAGNESIUM SERPL-MCNC: 2.7 MG/DL — HIGH (ref 1.8–2.4)
PHOSPHATE SERPL-MCNC: 4.3 MG/DL — SIGNIFICANT CHANGE UP (ref 2.5–4.5)
POTASSIUM SERPL-MCNC: 3.8 MMOL/L — SIGNIFICANT CHANGE UP (ref 3.5–5.3)
POTASSIUM SERPL-SCNC: 3.8 MMOL/L — SIGNIFICANT CHANGE UP (ref 3.5–5.3)
SODIUM SERPL-SCNC: 145 MMOL/L — SIGNIFICANT CHANGE UP (ref 135–145)

## 2017-01-08 PROCEDURE — 85730 THROMBOPLASTIN TIME PARTIAL: CPT

## 2017-01-08 PROCEDURE — 83735 ASSAY OF MAGNESIUM: CPT

## 2017-01-08 PROCEDURE — 99291 CRITICAL CARE FIRST HOUR: CPT | Mod: 25

## 2017-01-08 PROCEDURE — 80053 COMPREHEN METABOLIC PANEL: CPT

## 2017-01-08 PROCEDURE — 82803 BLOOD GASES ANY COMBINATION: CPT

## 2017-01-08 PROCEDURE — 82550 ASSAY OF CK (CPK): CPT

## 2017-01-08 PROCEDURE — 94660 CPAP INITIATION&MGMT: CPT

## 2017-01-08 PROCEDURE — 85610 PROTHROMBIN TIME: CPT

## 2017-01-08 PROCEDURE — 81001 URINALYSIS AUTO W/SCOPE: CPT

## 2017-01-08 PROCEDURE — 83036 HEMOGLOBIN GLYCOSYLATED A1C: CPT

## 2017-01-08 PROCEDURE — 82553 CREATINE MB FRACTION: CPT

## 2017-01-08 PROCEDURE — 80048 BASIC METABOLIC PNL TOTAL CA: CPT

## 2017-01-08 PROCEDURE — 87040 BLOOD CULTURE FOR BACTERIA: CPT

## 2017-01-08 PROCEDURE — 80061 LIPID PANEL: CPT

## 2017-01-08 PROCEDURE — 84100 ASSAY OF PHOSPHORUS: CPT

## 2017-01-08 PROCEDURE — 85027 COMPLETE CBC AUTOMATED: CPT

## 2017-01-08 PROCEDURE — 87086 URINE CULTURE/COLONY COUNT: CPT

## 2017-01-08 PROCEDURE — 84484 ASSAY OF TROPONIN QUANT: CPT

## 2017-01-08 PROCEDURE — 71045 X-RAY EXAM CHEST 1 VIEW: CPT

## 2017-01-08 PROCEDURE — 83880 ASSAY OF NATRIURETIC PEPTIDE: CPT

## 2017-01-08 RX ADMIN — Medication 100 MILLIGRAM(S): at 05:36

## 2017-01-08 RX ADMIN — AMIODARONE HYDROCHLORIDE 200 MILLIGRAM(S): 400 TABLET ORAL at 05:36

## 2017-01-08 RX ADMIN — HEPARIN SODIUM 5000 UNIT(S): 5000 INJECTION INTRAVENOUS; SUBCUTANEOUS at 14:24

## 2017-01-08 RX ADMIN — Medication 25 MILLIGRAM(S): at 05:43

## 2017-01-08 RX ADMIN — HEPARIN SODIUM 5000 UNIT(S): 5000 INJECTION INTRAVENOUS; SUBCUTANEOUS at 05:37

## 2017-01-08 RX ADMIN — PANTOPRAZOLE SODIUM 40 MILLIGRAM(S): 20 TABLET, DELAYED RELEASE ORAL at 06:15

## 2017-01-08 RX ADMIN — SPIRONOLACTONE 25 MILLIGRAM(S): 25 TABLET, FILM COATED ORAL at 05:36

## 2017-01-08 RX ADMIN — Medication 1 TABLET(S): at 12:12

## 2017-01-08 RX ADMIN — Medication 81 MILLIGRAM(S): at 12:11

## 2017-01-08 RX ADMIN — Medication 1 APPLICATION(S): at 12:11

## 2017-01-08 RX ADMIN — Medication 2.5 MILLIGRAM(S): at 05:36

## 2017-01-08 RX ADMIN — CLOPIDOGREL BISULFATE 75 MILLIGRAM(S): 75 TABLET, FILM COATED ORAL at 12:11

## 2017-01-08 RX ADMIN — Medication 40 MILLIGRAM(S): at 05:40

## 2017-01-09 LAB
CULTURE RESULTS: SIGNIFICANT CHANGE UP
CULTURE RESULTS: SIGNIFICANT CHANGE UP
SPECIMEN SOURCE: SIGNIFICANT CHANGE UP
SPECIMEN SOURCE: SIGNIFICANT CHANGE UP

## 2017-01-12 DIAGNOSIS — J96.01 ACUTE RESPIRATORY FAILURE WITH HYPOXIA: ICD-10-CM

## 2017-01-12 DIAGNOSIS — I13.0 HYPERTENSIVE HEART AND CHRONIC KIDNEY DISEASE WITH HEART FAILURE AND STAGE 1 THROUGH STAGE 4 CHRONIC KIDNEY DISEASE, OR UNSPECIFIED CHRONIC KIDNEY DISEASE: ICD-10-CM

## 2017-01-12 DIAGNOSIS — I25.2 OLD MYOCARDIAL INFARCTION: ICD-10-CM

## 2017-01-12 DIAGNOSIS — I50.23 ACUTE ON CHRONIC SYSTOLIC (CONGESTIVE) HEART FAILURE: ICD-10-CM

## 2017-01-12 DIAGNOSIS — I25.10 ATHEROSCLEROTIC HEART DISEASE OF NATIVE CORONARY ARTERY WITHOUT ANGINA PECTORIS: ICD-10-CM

## 2017-01-12 DIAGNOSIS — K21.9 GASTRO-ESOPHAGEAL REFLUX DISEASE WITHOUT ESOPHAGITIS: ICD-10-CM

## 2017-01-12 DIAGNOSIS — Z79.82 LONG TERM (CURRENT) USE OF ASPIRIN: ICD-10-CM

## 2017-01-12 DIAGNOSIS — Z88.1 ALLERGY STATUS TO OTHER ANTIBIOTIC AGENTS STATUS: ICD-10-CM

## 2017-01-12 DIAGNOSIS — M19.90 UNSPECIFIED OSTEOARTHRITIS, UNSPECIFIED SITE: ICD-10-CM

## 2017-01-12 DIAGNOSIS — R06.02 SHORTNESS OF BREATH: ICD-10-CM

## 2017-01-12 DIAGNOSIS — Z95.5 PRESENCE OF CORONARY ANGIOPLASTY IMPLANT AND GRAFT: ICD-10-CM

## 2017-01-12 DIAGNOSIS — I48.0 PAROXYSMAL ATRIAL FIBRILLATION: ICD-10-CM

## 2017-01-12 DIAGNOSIS — N18.3 CHRONIC KIDNEY DISEASE, STAGE 3 (MODERATE): ICD-10-CM

## 2018-09-21 NOTE — H&P ADULT. - GASTROINTESTINAL
[address] [address] [address] [facility] 
[phone] Patient: Eugenia Scott MRN: [id number] [id number] Visit Information Date & Time Provider Department Dept. Phone Encounter #  
 9/21/2018  2:30 PM Zach Larson [phone] [id number] Follow-up Instructions Return if symptoms worsen or fail to improve. Upcoming Health Maintenance Date Due  
 EYE EXAM RETINAL OR DILATED Q1 9/1/2014 DTaP/Tdap/Td series (2 - Td) 1/1/2018 FOOT EXAM Q1 3/15/2018 Influenza Age 5 to Adult 10/1/2019* Pneumococcal 19-64 Highest Risk (2 of 3 - PCV13) 12/21/2018 HEMOGLOBIN A1C Q6M 1/10/2019 PAP AKA CERVICAL CYTOLOGY 6/15/2019 MICROALBUMIN Q1 7/10/2019 LIPID PANEL Q1 7/10/2019 *Topic was postponed. The date shown is not the original due date. Allergies as of 9/21/2018  Review Complete On: 9/21/2018 By: Isabel Kapoor MD  
  
 Severity Noted Reaction Type Reactions Green Pepper  06/28/2012    Hives Current Immunizations  Reviewed on 3/18/2014 Name Date Influenza Vaccine 10/26/2013 TDAP Vaccine 1/1/2008 Not reviewed this visit You Were Diagnosed With   
  
 Codes Comments Acute non-recurrent maxillary sinusitis    -  Primary ICD-10-CM: J01.00 ICD-9-CM: 461.0 Sore throat     ICD-10-CM: J02.9 ICD-9-CM: 756 Vitals BP Pulse Temp Resp Height(growth percentile) Weight(growth percentile) 108/78 (BP 1 Location: Right arm, BP Patient Position: Sitting) 78 98 °F (36.7 °C) (Oral) 18 5' 5\" (1.651 m) 208 lb (94.3 kg) LMP SpO2 BMI OB Status Smoking Status 01/25/2012 98% 34.61 kg/m2 Hysterectomy Never Smoker BMI and BSA Data Body Mass Index Body Surface Area  
 34.61 kg/m 2 2.08 m 2 Preferred Pharmacy Pharmacy Name Phone [id number] Current, [address] [facility] [address] [phone] Your Updated Medication List  
  
   
This list is accurate as of 9/21/18  3:31 PM.  Always use your most recent med list.  
  
  
  
  
 * albuterol 2.5 mg /3 mL (0.083 %) nebulizer solution Commonly known as:  PROVENTIL VENTOLIN  
USE ONE VIAL IN NEBULIZER EVERY 4 HOURS AS NEEDED FOR WHEEZING FOR  UP  TO  30  DOSES * albuterol 90 mcg/actuation inhaler Commonly known as:  PROVENTIL HFA, VENTOLIN HFA, PROAIR HFA Take 2 Puffs by inhalation every four (4) hours as needed for Wheezing. Dispense proair. amoxicillin-clavulanate 875-125 mg per tablet Commonly known as:  AUGMENTIN Take 1 Tab by mouth every twelve (12) hours for 10 days. [employer] CONTOUR strip Generic drug:  glucose blood VI test strips USE AS DIRECTED  
  
 aspirin delayed-release 81 mg tablet TAKE 1 TABLET BY MOUTH EVERY DAY  
  
 butalbital-acetaminophen-caffeine -40 mg per tablet Commonly known as:  Sheria Loser Take 1 Tab by mouth every six (6) hours as needed for Pain. Max Daily Amount: 4 Tabs.  
  
 ergocalciferol 50,000 unit capsule Commonly known as:  ERGOCALCIFEROL  
TAKE 1 CAP BY MOUTH EVERY SEVEN (7) DAYS. famotidine 40 mg tablet Commonly known as:  PEPCID  
TAKE 1 TABLET BY MOUTH EVERY DAY  
  
 fenofibrate 160 mg tablet Commonly known as:  LOFIBRA TAKE 1 TAB BY MOUTH DAILY. fluticasone 50 mcg/actuation nasal spray Commonly known as:  Filbert Chard 2 Sprays by Both Nostrils route daily. JANUVIA 100 mg tablet Generic drug:  SITagliptin TAKE 1 TABLET BY MOUTH EVERY DAY  
  
 JARDIANCE 25 mg tablet Generic drug:  empagliflozin TAKE 1 TABLET BY MOUTH DAILY Lancets Misc Commonly known as:  MICROLET LANCET  
USE TO CHECK BLOOD SUGAR ONE TO TWO TIMES DAILY  
  
 metFORMIN 1,000 mg tablet Commonly known as:  GLUCOPHAGE  
TAKE 1 TABLET BY MOUTH TWICE A DAY  
  
 omeprazole 20 mg capsule Commonly known as:  PRILOSEC  
TAKE ONE CAPSULE BY MOUTH ONCE DAILY simvastatin 20 mg tablet Commonly known as:  ZOCOR  
TAKE 1 TABLET BY MOUTH EVERY DAY  
  
 SUMAtriptan 50 mg tablet Commonly known as:  IMITREX  
TAKE 1 TAB AT ONSET OF MIGRAINE HEADACHE, REPEAT IN 1 HOUR IF NEEDED. NO MORE THAN 2 TABS PER 24 HOUR  
  
 topiramate 50 mg tablet Commonly known as:  TOPAMAX Take 1 tab twice a day for migraine prevention  
  
 zolpidem 5 mg tablet Commonly known as:  AMBIEN Take 1 Tab by mouth nightly as needed for Sleep. Max Daily Amount: 5 mg.  
  
 * Notice: This list has 2 medication(s) that are the same as other medications prescribed for you. Read the directions carefully, and ask your doctor or other care provider to review them with you. Prescriptions Sent to Pharmacy Refills  
 amoxicillin-clavulanate (AUGMENTIN) 875-125 mg per tablet 0 Sig: Take 1 Tab by mouth every twelve (12) hours for 10 days. Class: Normal  
 Pharmacy: [facility] [address], [address] [place] [address] Ph #: [phone] Route: Oral  
  
We Performed the Following AMB POC RAPID STREP A [89902 CPT(R)] Follow-up Instructions Return if symptoms worsen or fail to improve. Patient Instructions Sinusitis: Care Instructions Your Care Instructions Sinusitis is an infection of the lining of the sinus cavities in your head. Sinusitis often follows a cold. It causes pain and pressure in your head and face. In most cases, sinusitis gets better on its own in 1 to 2 weeks. But some mild symptoms may last for several weeks. Sometimes antibiotics are needed. Follow-up care is a key part of your treatment and safety. Be sure to make and go to all appointments, and call your doctor if you are having problems. It's also a good idea to know your test results and keep a list of the medicines you take. How can you care for yourself at home?  
· Take an over-the-counter pain medicine, such as acetaminophen (Tylenol), ibuprofen (Advil, Motrin), or naproxen (Aleve). Read and follow all instructions on the label. · If the doctor prescribed antibiotics, take them as directed. Do not stop taking them just because you feel better. You need to take the full course of antibiotics. · Be careful when taking over-the-counter cold or flu medicines and Tylenol at the same time. Many of these medicines have acetaminophen, which is Tylenol. Read the labels to make sure that you are not taking more than the recommended dose. Too much acetaminophen (Tylenol) can be harmful. · Breathe warm, moist air from a steamy shower, a hot bath, or a sink filled with hot water. Avoid cold, dry air. Using a humidifier in your home may help. Follow the directions for cleaning the machine. · Use saline (saltwater) nasal washes to help keep your nasal passages open and wash out mucus and bacteria. You can buy saline nose drops at a grocery store or drugstore. Or you can make your own at home by adding 1 teaspoon of salt and 1 teaspoon of baking soda to 2 cups of distilled water. If you make your own, fill a bulb syringe with the solution, insert the tip into your nostril, and squeeze gently. Dante Opitz your nose. · Put a hot, wet towel or a warm gel pack on your face 3 or 4 times a day for 5 to 10 minutes each time. · Try a decongestant nasal spray like oxymetazoline (Afrin). Do not use it for more than 3 days in a row. Using it for more than 3 days can make your congestion worse. When should you call for help? Call your doctor now or seek immediate medical care if: 
  · You have new or worse swelling or redness in your face or around your eyes.  
  · You have a new or higher fever.  
 Watch closely for changes in your health, and be sure to contact your doctor if: 
  · You have new or worse facial pain.  
  · The mucus from your nose becomes thicker (like pus) or has new blood in it.  
  · You are not getting better as expected. Where can you learn more? Go to http://hodan-eugene.info/. Enter P720 in the search box to learn more about \"Sinusitis: Care Instructions. \" Current as of: May 12, 2017 Content Version: 11.7 © 5814-8408 [employer]You. Care instructions adapted under license by [employer] (which disclaims liability or warranty for this information). If you have questions about a medical condition or this instruction, always ask your healthcare professional. [address] any warranty or liability for your use of this information. Introducing [facility]! Naun Peralta introduces [employer] patient portal. Now you can access parts of your medical record, email your doctor's office, and request medication refills online. 1. In your internet browser, go to [email or web address] 2. Click on the First Time User? Click Here link in the Sign In box. You will see the New Member Sign Up page. 3. Enter your [employer] Access Code exactly as it appears below. You will not need to use this code after youve completed the sign-up process. If you do not sign up before the expiration date, you must request a new code. · [employer] Access Code: [email or web address] Expires: 11/29/2018 11:42 AM 
 
4. Enter the last four digits of your Social Security Number (xxxx) and Date of Birth (mm/dd/yyyy) as indicated and click Submit. You will be taken to the next sign-up page. 5. Create a [employer] ID. This will be your [employer] login ID and cannot be changed, so think of one that is secure and easy to remember. 6. Create a [employer] password. You can change your password at any time. 7. Enter your Password Reset Question and Answer. This can be used at a later time if you forget your password. 8. Enter your e-mail address. You will receive e-mail notification when new information is available in [address] 9. Click Sign Up. You can now view and download portions of your medical record. 10. Click the Download Summary menu link to download a portable copy of your medical information. If you have questions, please visit the Frequently Asked Questions section of the [employer] website. Remember, [employer] is NOT to be used for urgent needs. For medical emergencies, dial 911. Now available from your iPhone and Android! Please provide this summary of care documentation to your next provider. Your primary care clinician is listed as [address]. If you have any questions after today's visit, please call [phone]. detailed exam

## 2018-11-19 ENCOUNTER — INPATIENT (INPATIENT)
Facility: HOSPITAL | Age: 83
LOS: 6 days | Discharge: NURSING, REHAB OR LONG-TERM CARE | DRG: 291 | End: 2018-11-26
Attending: INTERNAL MEDICINE | Admitting: INTERNAL MEDICINE
Payer: MEDICARE

## 2018-11-19 VITALS
TEMPERATURE: 98 F | RESPIRATION RATE: 17 BRPM | HEART RATE: 53 BPM | DIASTOLIC BLOOD PRESSURE: 58 MMHG | SYSTOLIC BLOOD PRESSURE: 115 MMHG | OXYGEN SATURATION: 96 % | WEIGHT: 115.08 LBS

## 2018-11-19 DIAGNOSIS — R74.8 ABNORMAL LEVELS OF OTHER SERUM ENZYMES: ICD-10-CM

## 2018-11-19 DIAGNOSIS — I25.10 ATHEROSCLEROTIC HEART DISEASE OF NATIVE CORONARY ARTERY WITHOUT ANGINA PECTORIS: ICD-10-CM

## 2018-11-19 DIAGNOSIS — Z95.5 PRESENCE OF CORONARY ANGIOPLASTY IMPLANT AND GRAFT: Chronic | ICD-10-CM

## 2018-11-19 DIAGNOSIS — I48.91 UNSPECIFIED ATRIAL FIBRILLATION: ICD-10-CM

## 2018-11-19 DIAGNOSIS — N39.0 URINARY TRACT INFECTION, SITE NOT SPECIFIED: ICD-10-CM

## 2018-11-19 DIAGNOSIS — M79.89 OTHER SPECIFIED SOFT TISSUE DISORDERS: ICD-10-CM

## 2018-11-19 DIAGNOSIS — F03.90 UNSPECIFIED DEMENTIA WITHOUT BEHAVIORAL DISTURBANCE: ICD-10-CM

## 2018-11-19 DIAGNOSIS — I50.9 HEART FAILURE, UNSPECIFIED: ICD-10-CM

## 2018-11-19 DIAGNOSIS — R62.7 ADULT FAILURE TO THRIVE: ICD-10-CM

## 2018-11-19 DIAGNOSIS — Z29.9 ENCOUNTER FOR PROPHYLACTIC MEASURES, UNSPECIFIED: ICD-10-CM

## 2018-11-19 LAB
ACETONE SERPL-MCNC: NEGATIVE — SIGNIFICANT CHANGE UP
ALBUMIN SERPL ELPH-MCNC: 2.8 G/DL — LOW (ref 3.5–5)
ALP SERPL-CCNC: 237 U/L — HIGH (ref 40–120)
ALT FLD-CCNC: 22 U/L DA — SIGNIFICANT CHANGE UP (ref 10–60)
ANION GAP SERPL CALC-SCNC: 12 MMOL/L — SIGNIFICANT CHANGE UP (ref 5–17)
APPEARANCE UR: CLEAR — SIGNIFICANT CHANGE UP
APTT BLD: 28.6 SEC — SIGNIFICANT CHANGE UP (ref 27.5–36.3)
AST SERPL-CCNC: 31 U/L — SIGNIFICANT CHANGE UP (ref 10–40)
BACTERIA # UR AUTO: ABNORMAL /HPF
BASOPHILS # BLD AUTO: 0 K/UL — SIGNIFICANT CHANGE UP (ref 0–0.2)
BASOPHILS NFR BLD AUTO: 0.3 % — SIGNIFICANT CHANGE UP (ref 0–2)
BILIRUB SERPL-MCNC: 1.6 MG/DL — HIGH (ref 0.2–1.2)
BILIRUB UR-MCNC: NEGATIVE — SIGNIFICANT CHANGE UP
BUN SERPL-MCNC: 44 MG/DL — HIGH (ref 7–18)
CALCIUM SERPL-MCNC: 8.2 MG/DL — LOW (ref 8.4–10.5)
CHLORIDE SERPL-SCNC: 114 MMOL/L — HIGH (ref 96–108)
CK MB BLD-MCNC: 12.7 % — HIGH (ref 0–3.5)
CK MB CFR SERPL CALC: 3.3 NG/ML — SIGNIFICANT CHANGE UP (ref 0–3.6)
CK SERPL-CCNC: 222 U/L — HIGH (ref 21–215)
CK SERPL-CCNC: 26 U/L — SIGNIFICANT CHANGE UP (ref 21–215)
CO2 SERPL-SCNC: 22 MMOL/L — SIGNIFICANT CHANGE UP (ref 22–31)
COLOR SPEC: YELLOW — SIGNIFICANT CHANGE UP
COMMENT - URINE: SIGNIFICANT CHANGE UP
CREAT SERPL-MCNC: 1.58 MG/DL — HIGH (ref 0.5–1.3)
DIFF PNL FLD: ABNORMAL
EOSINOPHIL # BLD AUTO: 0 K/UL — SIGNIFICANT CHANGE UP (ref 0–0.5)
EOSINOPHIL NFR BLD AUTO: 0.2 % — SIGNIFICANT CHANGE UP (ref 0–6)
EPI CELLS # UR: SIGNIFICANT CHANGE UP /HPF
GLUCOSE SERPL-MCNC: 97 MG/DL — SIGNIFICANT CHANGE UP (ref 70–99)
GLUCOSE UR QL: NEGATIVE — SIGNIFICANT CHANGE UP
HCT VFR BLD CALC: 36.8 % — SIGNIFICANT CHANGE UP (ref 34.5–45)
HGB BLD-MCNC: 11 G/DL — LOW (ref 11.5–15.5)
INR BLD: 1.24 RATIO — HIGH (ref 0.88–1.16)
KETONES UR-MCNC: NEGATIVE — SIGNIFICANT CHANGE UP
LACTATE SERPL-SCNC: 1.7 MMOL/L — SIGNIFICANT CHANGE UP (ref 0.7–2)
LEUKOCYTE ESTERASE UR-ACNC: ABNORMAL
LYMPHOCYTES # BLD AUTO: 0.7 K/UL — LOW (ref 1–3.3)
LYMPHOCYTES # BLD AUTO: 7.5 % — LOW (ref 13–44)
MAGNESIUM SERPL-MCNC: 2.4 MG/DL — SIGNIFICANT CHANGE UP (ref 1.6–2.6)
MCHC RBC-ENTMCNC: 25.8 PG — LOW (ref 27–34)
MCHC RBC-ENTMCNC: 29.8 GM/DL — LOW (ref 32–36)
MCV RBC AUTO: 86.8 FL — SIGNIFICANT CHANGE UP (ref 80–100)
MONOCYTES # BLD AUTO: 0.8 K/UL — SIGNIFICANT CHANGE UP (ref 0–0.9)
MONOCYTES NFR BLD AUTO: 9.2 % — SIGNIFICANT CHANGE UP (ref 2–14)
NEUTROPHILS # BLD AUTO: 7.7 K/UL — HIGH (ref 1.8–7.4)
NEUTROPHILS NFR BLD AUTO: 82.8 % — HIGH (ref 43–77)
NITRITE UR-MCNC: NEGATIVE — SIGNIFICANT CHANGE UP
NT-PROBNP SERPL-SCNC: HIGH PG/ML (ref 0–450)
PH UR: 5 — SIGNIFICANT CHANGE UP (ref 5–8)
PLATELET # BLD AUTO: 294 K/UL — SIGNIFICANT CHANGE UP (ref 150–400)
POTASSIUM SERPL-MCNC: 3.4 MMOL/L — LOW (ref 3.5–5.3)
POTASSIUM SERPL-SCNC: 3.4 MMOL/L — LOW (ref 3.5–5.3)
PROT SERPL-MCNC: 7.3 G/DL — SIGNIFICANT CHANGE UP (ref 6–8.3)
PROT UR-MCNC: 15
PROTHROM AB SERPL-ACNC: 13.8 SEC — HIGH (ref 10–12.9)
RBC # BLD: 4.24 M/UL — SIGNIFICANT CHANGE UP (ref 3.8–5.2)
RBC # FLD: 17.4 % — HIGH (ref 10.3–14.5)
RBC CASTS # UR COMP ASSIST: ABNORMAL /HPF (ref 0–2)
SODIUM SERPL-SCNC: 148 MMOL/L — HIGH (ref 135–145)
SP GR SPEC: 1.02 — SIGNIFICANT CHANGE UP (ref 1.01–1.02)
TROPONIN I SERPL-MCNC: 0.07 NG/ML — HIGH (ref 0–0.04)
TROPONIN I SERPL-MCNC: 0.09 NG/ML — HIGH (ref 0–0.04)
UROBILINOGEN FLD QL: 4
WBC # BLD: 9.3 K/UL — SIGNIFICANT CHANGE UP (ref 3.8–10.5)
WBC # FLD AUTO: 9.3 K/UL — SIGNIFICANT CHANGE UP (ref 3.8–10.5)
WBC UR QL: ABNORMAL /HPF (ref 0–5)

## 2018-11-19 PROCEDURE — 73590 X-RAY EXAM OF LOWER LEG: CPT | Mod: 26,RT

## 2018-11-19 PROCEDURE — 73562 X-RAY EXAM OF KNEE 3: CPT | Mod: 26,RT

## 2018-11-19 PROCEDURE — 71045 X-RAY EXAM CHEST 1 VIEW: CPT | Mod: 26

## 2018-11-19 PROCEDURE — 93971 EXTREMITY STUDY: CPT | Mod: 26,RT

## 2018-11-19 PROCEDURE — 73502 X-RAY EXAM HIP UNI 2-3 VIEWS: CPT | Mod: 26,RT

## 2018-11-19 PROCEDURE — 99285 EMERGENCY DEPT VISIT HI MDM: CPT

## 2018-11-19 PROCEDURE — 73610 X-RAY EXAM OF ANKLE: CPT | Mod: 26,RT

## 2018-11-19 PROCEDURE — 70450 CT HEAD/BRAIN W/O DYE: CPT | Mod: 26

## 2018-11-19 RX ORDER — PIPERACILLIN AND TAZOBACTAM 4; .5 G/20ML; G/20ML
3.38 INJECTION, POWDER, LYOPHILIZED, FOR SOLUTION INTRAVENOUS ONCE
Qty: 0 | Refills: 0 | Status: COMPLETED | OUTPATIENT
Start: 2018-11-19 | End: 2018-11-19

## 2018-11-19 RX ORDER — ACETAMINOPHEN 500 MG
650 TABLET ORAL EVERY 6 HOURS
Qty: 0 | Refills: 0 | Status: DISCONTINUED | OUTPATIENT
Start: 2018-11-19 | End: 2018-11-26

## 2018-11-19 RX ORDER — AMIODARONE HYDROCHLORIDE 400 MG/1
200 TABLET ORAL DAILY
Qty: 0 | Refills: 0 | Status: DISCONTINUED | OUTPATIENT
Start: 2018-11-19 | End: 2018-11-20

## 2018-11-19 RX ORDER — INFLUENZA VIRUS VACCINE 15; 15; 15; 15 UG/.5ML; UG/.5ML; UG/.5ML; UG/.5ML
0.5 SUSPENSION INTRAMUSCULAR ONCE
Qty: 0 | Refills: 0 | Status: COMPLETED | OUTPATIENT
Start: 2018-11-19 | End: 2018-11-23

## 2018-11-19 RX ORDER — SODIUM CHLORIDE 9 MG/ML
1000 INJECTION INTRAMUSCULAR; INTRAVENOUS; SUBCUTANEOUS
Qty: 0 | Refills: 0 | Status: DISCONTINUED | OUTPATIENT
Start: 2018-11-19 | End: 2018-11-19

## 2018-11-19 RX ORDER — FUROSEMIDE 40 MG
40 TABLET ORAL EVERY 12 HOURS
Qty: 0 | Refills: 0 | Status: DISCONTINUED | OUTPATIENT
Start: 2018-11-19 | End: 2018-11-26

## 2018-11-19 RX ORDER — CEFTRIAXONE 500 MG/1
1 INJECTION, POWDER, FOR SOLUTION INTRAMUSCULAR; INTRAVENOUS EVERY 24 HOURS
Qty: 0 | Refills: 0 | Status: DISCONTINUED | OUTPATIENT
Start: 2018-11-19 | End: 2018-11-26

## 2018-11-19 RX ORDER — FUROSEMIDE 40 MG
1 TABLET ORAL
Qty: 0 | Refills: 0 | COMMUNITY

## 2018-11-19 RX ORDER — ATORVASTATIN CALCIUM 80 MG/1
1 TABLET, FILM COATED ORAL
Qty: 0 | Refills: 0 | COMMUNITY

## 2018-11-19 RX ORDER — ASPIRIN/CALCIUM CARB/MAGNESIUM 324 MG
81 TABLET ORAL DAILY
Qty: 0 | Refills: 0 | Status: DISCONTINUED | OUTPATIENT
Start: 2018-11-19 | End: 2018-11-26

## 2018-11-19 RX ORDER — METOPROLOL TARTRATE 50 MG
12.5 TABLET ORAL
Qty: 0 | Refills: 0 | Status: DISCONTINUED | OUTPATIENT
Start: 2018-11-19 | End: 2018-11-26

## 2018-11-19 RX ORDER — ACETAMINOPHEN 500 MG
1000 TABLET ORAL ONCE
Qty: 0 | Refills: 0 | Status: DISCONTINUED | OUTPATIENT
Start: 2018-11-19 | End: 2018-11-26

## 2018-11-19 RX ORDER — ENOXAPARIN SODIUM 100 MG/ML
30 INJECTION SUBCUTANEOUS DAILY
Qty: 0 | Refills: 0 | Status: DISCONTINUED | OUTPATIENT
Start: 2018-11-19 | End: 2018-11-26

## 2018-11-19 RX ORDER — POTASSIUM CHLORIDE 20 MEQ
40 PACKET (EA) ORAL ONCE
Qty: 0 | Refills: 0 | Status: COMPLETED | OUTPATIENT
Start: 2018-11-19 | End: 2018-11-19

## 2018-11-19 RX ORDER — FUROSEMIDE 40 MG
40 TABLET ORAL DAILY
Qty: 0 | Refills: 0 | Status: DISCONTINUED | OUTPATIENT
Start: 2018-11-19 | End: 2018-11-19

## 2018-11-19 RX ADMIN — Medication 40 MILLIEQUIVALENT(S): at 17:48

## 2018-11-19 RX ADMIN — PIPERACILLIN AND TAZOBACTAM 200 GRAM(S): 4; .5 INJECTION, POWDER, LYOPHILIZED, FOR SOLUTION INTRAVENOUS at 17:48

## 2018-11-19 RX ADMIN — CEFTRIAXONE 100 GRAM(S): 500 INJECTION, POWDER, FOR SOLUTION INTRAMUSCULAR; INTRAVENOUS at 22:53

## 2018-11-19 NOTE — H&P ADULT - PROBLEM SELECTOR PLAN 3
-chronic right leg swelling, but new bruising s/p fall   -cannot move extremity 2/2 to pain   -will get CT scan of left lower leg to rule out any fracture or hematoma   -dopplers of lower extremity to r/o DVT, given her immobility for 1 week -chronic right leg swelling, but new bruising s/p fall   -cannot move extremity 2/2 to pain   -will get CT scan of left lower leg to rule out any fracture or hematoma   -dopplers of lower extremity to r/o DVT, given her immobility for 1 week  -Please consult orthopedics in am after f/u CT of lower extremity -chronic right leg swelling, but new bruising s/p fall   -cannot move extremity 2/2 to pain   -X ray shows large supra patellar effusion.   -will get CT scan of left lower leg to rule out any fracture or hematoma   -dopplers of lower extremity to r/o DVT, given her immobility for 1 week  -Please consult orthopedics in am after f/u CT of lower extremity for evaluation of large supra patellar effusion

## 2018-11-19 NOTE — ED ADULT NURSE NOTE - PMH
CAD (coronary artery disease)  S/p stent x 4 (remote)  CAD (coronary artery disease)  s/p 4 stents  CHF (congestive heart failure)  hfref  CHF, chronic  EF 40-45%  CKD (chronic kidney disease), stage 3 (moderate)    HTN (hypertension)    HTN (hypertension)    MI (myocardial infarction)    Osteoarthritis    Osteoarthritis    Paroxysmal atrial fibrillation    Paroxysmal atrial fibrillation

## 2018-11-19 NOTE — H&P ADULT - PMH
CAD (coronary artery disease)  S/p stent x 4 (remote)  CAD (coronary artery disease)  s/p 4 stents  CHF (congestive heart failure)  hfref  CHF, chronic  EF 40-45%  CKD (chronic kidney disease), stage 3 (moderate)    Dementia    HTN (hypertension)    HTN (hypertension)    MI (myocardial infarction)    Osteoarthritis    Osteoarthritis    Paroxysmal atrial fibrillation    Paroxysmal atrial fibrillation

## 2018-11-19 NOTE — H&P ADULT - PROBLEM SELECTOR PLAN 2
-Patient presented with Poor PO in take, but denies chest pain or SOB  -CXR looks congested with elevated pro BNP.   -will start on Lasix 40 IV daily as patient takes 80 mg po daily at Home  -f/u repeat echocardiogram   -daily weights  -strict I/Os -Patient presented with Poor PO in take, but denies chest pain or SOB  -CXR looks congested with elevated pro BNP.   -will start on Lasix 40 IV BID as patient takes 80 mg po daily at Home, discussed with Dr Carrillo  -f/u repeat echocardiogram   -daily weights  -strict I/Os

## 2018-11-19 NOTE — ED PROVIDER NOTE - MUSCULOSKELETAL, MLM
Spine appears kyphotic, range of motion is limited, RLE>LLE, Rt knee tenderness to palp, hematoma to lower ext below knee & ankle

## 2018-11-19 NOTE — ED ADULT NURSE NOTE - NSIMPLEMENTINTERV_GEN_ALL_ED
Implemented All Fall with Harm Risk Interventions:  [place] to call system. Call bell, personal items and telephone within reach. Instruct patient to call for assistance. Room bathroom lighting operational. Non-slip footwear when patient is off stretcher. Physically safe environment: no spills, clutter or unnecessary equipment. Stretcher in lowest position, wheels locked, appropriate side rails in place. Provide visual cue, wrist band, yellow gown, etc. Monitor gait and stability. Monitor for mental status changes and reorient to person, place, and time. Review medications for side effects contributing to fall risk. Reinforce activity limits and safety measures with patient and family. Provide visual clues: red socks.

## 2018-11-19 NOTE — H&P ADULT - PROBLEM SELECTOR PLAN 4
-Patient has worsening of mental status  -Has a Positive UA  -could be a cause of worsening mental status  -will treat empirically with rocephin until culture returns

## 2018-11-19 NOTE — ED PROVIDER NOTE - CARE PLAN
Principal Discharge DX:	CHF exacerbation  Secondary Diagnosis:	Failure to thrive in adult  Secondary Diagnosis:	Renal insufficiency

## 2018-11-19 NOTE — ED PROVIDER NOTE - OBJECTIVE STATEMENT
86 y.o. female BIBA as per daughter, pt has been walking till 1 week ago, weakness for 1 week, no appetite for past 3 days, urinary incontinence for past few days, no fever, coughing, dysuria, n/v,

## 2018-11-19 NOTE — CHART NOTE - NSCHARTNOTEFT_GEN_A_CORE
Patient refused CT of hip and knee. She was moving a lot while CT head as well. Discussed with Daughter at length. She would comply with her Mother's wishes. Will not per akila any further imaging studies

## 2018-11-19 NOTE — H&P ADULT - PROBLEM SELECTOR PLAN 1
-Patient presented with poor PO in take, inability to walk  -could be secondary to infectious etiology, worsening of dementia versus new onset stroke  -Patient's UA is +ve, elevated neutrophils  82.8  -will treated underlying cause and monitor for improvement   -c/w Paroxetine at Home dose   -will start on Rocephin empirically until blood and urine culture returns   -will get CT head to evaluate for bleeding or new stroke

## 2018-11-19 NOTE — H&P ADULT - PROBLEM SELECTOR PLAN 6
-Patient is on metoprolol 12.5 BID  -on no AC, Only on Plavix and aspirn, Aspirin was held by daughter 10 days ago after she fell  -will resume Aspirin, will hold Plavix given bruises, Pain and inability to move right lower extremity, until rule out hematoma

## 2018-11-19 NOTE — H&P ADULT - HISTORY OF PRESENT ILLNESS
Patient is a 86 year old female from home lives with daughter, walks with a walker with PMH of Severe Dementia, Afib (not on AC by choice), CHF echo in 2017 showed severe LV global dysfunction ,Intubations x 4 in past for CHF, CAD s/p 5 stents , HTN, osteoarthritis was brought to the ED by daughter for inability to walk and poor PO in take x 1 week. Patient is a very poor historian and history was obtained by daughter Guzman at bed side. Patient's daughter reported that 1 week ago, Patient was able to walk with her walker at Home and is more alert, but now she has stopped walking and stays in her bed most of the time. She also has urinary incontinence. Patient slipped from her bed 2 weeks ago and landed on her side. it was an unwitnessed fall. Daughter reports that patient is been drinking a lot of water, but her PO in take is almost none. Patient's daughter hasn't noted any shortness of breath, fever, chest pain. Patient has chronic right leg swelling, but now she has new bruises on her right leg, for which she held her aspirin after speaking with her PMD.     In ED Patient; s vitals are stable and she is in no acute distress. Patient is AAO x 1 to self, she is unable to follow commands and resident was unable to perform complete neurological exam. However strength is 5/5 in bilateral right upper extremities, she can lift her left lower extremity against gravity, but unable to move right lower extremity. CXR shows fluid overload and Pro BNP is 44084, Troponin 0.094. UA is positive. EKG is NSR with LBBB HR 63 b/min.   GOC: Patient is DNR/DNI. Daughter is surrogate and doesn't want her Mother to suffer.

## 2018-11-19 NOTE — ED ADULT NURSE NOTE - OBJECTIVE STATEMENT
Pt was brought in by daughter for weakness. pt was able to ambulate with walker a week ago, unable to ambulate currently. Pt does not have an appetite anymore for the pass three days. Bilateral lower extremity ecchymosis.

## 2018-11-19 NOTE — H&P ADULT - PROBLEM SELECTOR PLAN 9
RISK                                                          Points  [] Previous VTE                                           3  [] Thrombophilia                                        2  [] Lower limb paralysis                              2   [] Current Cancer                                       2   [x] Immobilization > 24 hrs                        1  [] ICU/CCU stay > 24 hours                       1  [x] Age > 60                                                   1    DVT prophylaxis with Lovenox.

## 2018-11-19 NOTE — H&P ADULT - ASSESSMENT
Patient is a 86 year old female from home lives with daughter, walks with a walker with PMH of Severe Dementia, Afib (not on AC by choice), CHF echo in 2017 showed severe LV global dysfunction ,Intubations x 4 in past for CHF, CAD s/p 5 stents , HTN, osteoarthritis was brought to the ED by daughter for inability to walk and poor PO in take x 1 week. Admitted for FTT, UTI

## 2018-11-19 NOTE — H&P ADULT - PROBLEM SELECTOR PLAN 7
-Patient's troponins are elevated to 0.09  -Patient has CHF, denies any chest Pain  -EKG is NSR, with LBBB. Last cardiac cath in Nov 2016 s/p 5th drug eluting stent placement for stenosis of Circumflex artery ,100% stenosis of LAD.  -likely demand ischemia from CHF  -will c/w aspirin, metoprolol and atorvastatin  -will hold Plavix as it has been > 1 year since her last stent.   -will trend troponin x 3

## 2018-11-20 DIAGNOSIS — E03.9 HYPOTHYROIDISM, UNSPECIFIED: ICD-10-CM

## 2018-11-20 DIAGNOSIS — R79.89 OTHER SPECIFIED ABNORMAL FINDINGS OF BLOOD CHEMISTRY: ICD-10-CM

## 2018-11-20 LAB
24R-OH-CALCIDIOL SERPL-MCNC: 11.6 NG/ML — LOW (ref 30–80)
ALBUMIN SERPL ELPH-MCNC: 2.4 G/DL — LOW (ref 3.5–5)
ALP SERPL-CCNC: 199 U/L — HIGH (ref 40–120)
ALT FLD-CCNC: 20 U/L DA — SIGNIFICANT CHANGE UP (ref 10–60)
ANION GAP SERPL CALC-SCNC: 10 MMOL/L — SIGNIFICANT CHANGE UP (ref 5–17)
AST SERPL-CCNC: 26 U/L — SIGNIFICANT CHANGE UP (ref 10–40)
BASOPHILS # BLD AUTO: 0 K/UL — SIGNIFICANT CHANGE UP (ref 0–0.2)
BASOPHILS NFR BLD AUTO: 0.2 % — SIGNIFICANT CHANGE UP (ref 0–2)
BILIRUB SERPL-MCNC: 1.3 MG/DL — HIGH (ref 0.2–1.2)
BUN SERPL-MCNC: 44 MG/DL — HIGH (ref 7–18)
CALCIUM SERPL-MCNC: 8.1 MG/DL — LOW (ref 8.4–10.5)
CHLORIDE SERPL-SCNC: 113 MMOL/L — HIGH (ref 96–108)
CHOLEST SERPL-MCNC: 137 MG/DL — SIGNIFICANT CHANGE UP (ref 10–199)
CO2 SERPL-SCNC: 23 MMOL/L — SIGNIFICANT CHANGE UP (ref 22–31)
CREAT SERPL-MCNC: 1.55 MG/DL — HIGH (ref 0.5–1.3)
EOSINOPHIL # BLD AUTO: 0.1 K/UL — SIGNIFICANT CHANGE UP (ref 0–0.5)
EOSINOPHIL NFR BLD AUTO: 1.4 % — SIGNIFICANT CHANGE UP (ref 0–6)
GLUCOSE SERPL-MCNC: 101 MG/DL — HIGH (ref 70–99)
HBA1C BLD-MCNC: 6.1 % — HIGH (ref 4–5.6)
HCT VFR BLD CALC: 33.9 % — LOW (ref 34.5–45)
HDLC SERPL-MCNC: 31 MG/DL — LOW
HGB BLD-MCNC: 10.2 G/DL — LOW (ref 11.5–15.5)
LIPID PNL WITH DIRECT LDL SERPL: 86 MG/DL — SIGNIFICANT CHANGE UP
LYMPHOCYTES # BLD AUTO: 0.8 K/UL — LOW (ref 1–3.3)
LYMPHOCYTES # BLD AUTO: 8.3 % — LOW (ref 13–44)
MAGNESIUM SERPL-MCNC: 2.5 MG/DL — SIGNIFICANT CHANGE UP (ref 1.6–2.6)
MCHC RBC-ENTMCNC: 26.2 PG — LOW (ref 27–34)
MCHC RBC-ENTMCNC: 30.2 GM/DL — LOW (ref 32–36)
MCV RBC AUTO: 86.9 FL — SIGNIFICANT CHANGE UP (ref 80–100)
MONOCYTES # BLD AUTO: 0.9 K/UL — SIGNIFICANT CHANGE UP (ref 0–0.9)
MONOCYTES NFR BLD AUTO: 10 % — SIGNIFICANT CHANGE UP (ref 2–14)
NEUTROPHILS # BLD AUTO: 7.6 K/UL — HIGH (ref 1.8–7.4)
NEUTROPHILS NFR BLD AUTO: 80.2 % — HIGH (ref 43–77)
PHOSPHATE SERPL-MCNC: 2.6 MG/DL — SIGNIFICANT CHANGE UP (ref 2.5–4.5)
PLATELET # BLD AUTO: 259 K/UL — SIGNIFICANT CHANGE UP (ref 150–400)
POTASSIUM SERPL-MCNC: 3.8 MMOL/L — SIGNIFICANT CHANGE UP (ref 3.5–5.3)
POTASSIUM SERPL-SCNC: 3.8 MMOL/L — SIGNIFICANT CHANGE UP (ref 3.5–5.3)
PROT SERPL-MCNC: 6.8 G/DL — SIGNIFICANT CHANGE UP (ref 6–8.3)
RBC # BLD: 3.9 M/UL — SIGNIFICANT CHANGE UP (ref 3.8–5.2)
RBC # FLD: 16.9 % — HIGH (ref 10.3–14.5)
SODIUM SERPL-SCNC: 146 MMOL/L — HIGH (ref 135–145)
TOTAL CHOLESTEROL/HDL RATIO MEASUREMENT: 4.4 RATIO — SIGNIFICANT CHANGE UP (ref 3.3–7.1)
TRIGL SERPL-MCNC: 99 MG/DL — SIGNIFICANT CHANGE UP (ref 10–149)
TSH SERPL-MCNC: 37.3 UU/ML — HIGH (ref 0.34–4.82)
VIT B12 SERPL-MCNC: 733 PG/ML — SIGNIFICANT CHANGE UP (ref 232–1245)
WBC # BLD: 9.5 K/UL — SIGNIFICANT CHANGE UP (ref 3.8–10.5)
WBC # FLD AUTO: 9.5 K/UL — SIGNIFICANT CHANGE UP (ref 3.8–10.5)

## 2018-11-20 RX ORDER — LIDOCAINE HCL 20 MG/ML
10 VIAL (ML) INJECTION ONCE
Qty: 0 | Refills: 0 | Status: COMPLETED | OUTPATIENT
Start: 2018-11-20 | End: 2018-11-20

## 2018-11-20 RX ORDER — LEVOTHYROXINE SODIUM 125 MCG
25 TABLET ORAL DAILY
Qty: 0 | Refills: 0 | Status: DISCONTINUED | OUTPATIENT
Start: 2018-11-20 | End: 2018-11-26

## 2018-11-20 RX ORDER — SODIUM CHLORIDE 9 MG/ML
250 INJECTION INTRAMUSCULAR; INTRAVENOUS; SUBCUTANEOUS ONCE
Qty: 0 | Refills: 0 | Status: COMPLETED | OUTPATIENT
Start: 2018-11-20 | End: 2018-11-20

## 2018-11-20 RX ORDER — AMIODARONE HYDROCHLORIDE 400 MG/1
200 TABLET ORAL DAILY
Qty: 0 | Refills: 0 | Status: DISCONTINUED | OUTPATIENT
Start: 2018-11-20 | End: 2018-11-26

## 2018-11-20 RX ADMIN — Medication 40 MILLIGRAM(S): at 18:33

## 2018-11-20 RX ADMIN — CEFTRIAXONE 100 GRAM(S): 500 INJECTION, POWDER, FOR SOLUTION INTRAMUSCULAR; INTRAVENOUS at 21:53

## 2018-11-20 RX ADMIN — Medication 10 MILLIGRAM(S): at 13:58

## 2018-11-20 RX ADMIN — Medication 40 MILLIGRAM(S): at 05:30

## 2018-11-20 RX ADMIN — AMIODARONE HYDROCHLORIDE 200 MILLIGRAM(S): 400 TABLET ORAL at 05:33

## 2018-11-20 RX ADMIN — Medication 12.5 MILLIGRAM(S): at 05:31

## 2018-11-20 RX ADMIN — Medication 10 MILLILITER(S): at 19:00

## 2018-11-20 RX ADMIN — Medication 81 MILLIGRAM(S): at 12:49

## 2018-11-20 RX ADMIN — SODIUM CHLORIDE 500 MILLILITER(S): 9 INJECTION INTRAMUSCULAR; INTRAVENOUS; SUBCUTANEOUS at 19:52

## 2018-11-20 RX ADMIN — ENOXAPARIN SODIUM 30 MILLIGRAM(S): 100 INJECTION SUBCUTANEOUS at 12:49

## 2018-11-20 NOTE — PROGRESS NOTE ADULT - PROBLEM SELECTOR PLAN 3
-chronic right leg swelling, but new bruising s/p fall   -cannot move extremity 2/2 to pain   -X ray shows large supra patellar effusion.   -will get CT scan of left lower leg to rule out any fracture or hematoma   -dopplers of lower extremity normal  -Please consult orthopedics in am after f/u CT of lower extremity for evaluation of large supra patellar effusion -chronic right leg swelling, but new bruising s/p fall   -cannot move extremity 2/2 to pain   -X ray shows large supra patellar effusion.   -refused CT scan of left lower leg to rule out any fracture or hematoma   -dopplers of lower extremity normal  -consulted orthopedics for evaluation of large supra patellar effusion TSH elevated   FU t3,t4  likely due to amiodarone

## 2018-11-20 NOTE — PROGRESS NOTE ADULT - PROBLEM SELECTOR PLAN 9
RISK                                                          Points  [] Previous VTE                                           3  [] Thrombophilia                                        2  [] Lower limb paralysis                              2   [] Current Cancer                                       2   [x] Immobilization > 24 hrs                        1  [] ICU/CCU stay > 24 hours                       1  [x] Age > 60                                                   1    DVT prophylaxis with Lovenox. -c/w aspirin, statin and BB

## 2018-11-20 NOTE — CONSULT NOTE ADULT - ASSESSMENT
Patient is a 86 year old female from home lives with daughter, walks with a walker with PMH of Severe Dementia, Afib (not on AC by choice), CHF echo in 2017 showed severe LV global dysfunction ,Intubations x 4 in past for CHF, CAD s/p 5 stents , HTN, osteoarthritis was brought to the ED by daughter for inability to walk and poor PO, urinary incontinence in take x 1 week. Admitted for FTT, UTI.
Patient is a 86 year old female from home lives with daughter, walks with a walker with PMH of Severe Dementia, Afib (not on AC by choice), CHF echo in 2017 showed severe LV global dysfunction ,Intubations x 4 in past for CHF, CAD s/p 5 stents , HTN, osteoarthritis was brought to the ED by daughter for inability to walk and poor PO in take x 1 week. found to have uti/ chf exac and high TSH

## 2018-11-20 NOTE — CONSULT NOTE ADULT - SUBJECTIVE AND OBJECTIVE BOX
patient seen  and examined a  chart reviewed    Patient is a 86y old  Female who presents with a chief complaint of FTT , CHF (2018 18:30)      Is better  but still very weak    MEDICATIONS  (STANDING):  acetaminophen  IVPB .. 1000 milliGRAM(s) IV Intermittent once  amiodarone    Tablet 200 milliGRAM(s) Oral daily  aspirin  chewable 81 milliGRAM(s) Oral daily  cefTRIAXone   IVPB 1 Gram(s) IV Intermittent every 24 hours  enoxaparin Injectable 30 milliGRAM(s) SubCutaneous daily  furosemide   Injectable 40 milliGRAM(s) IV Push every 12 hours  influenza   Vaccine 0.5 milliLiter(s) IntraMuscular once  metoprolol tartrate 12.5 milliGRAM(s) Oral two times a day  PARoxetine 10 milliGRAM(s) Oral daily    MEDICATIONS  (PRN):  acetaminophen   Tablet .. 650 milliGRAM(s) Oral every 6 hours PRN Temp greater or equal to 38C (100.4F), Moderate Pain (4 - 6)      __________________________________________________  REVIEW OF SYSTEMS:    CONSTITUTIONAL: No fever,   EYES: no acute visual disturbances  NECK: No pain or stiffness  RESPIRATORY: No cough; No shortness of breath  CARDIOVASCULAR: No chest pain, no palpitations  GASTROINTESTINAL: No pain. No nausea or vomiting; No diarrhea   NEUROLOGICAL: No headache or numbness, no tremors  MUSCULOSKELETAL: No joint pain, no muscle pain  GENITOURINARY: no dysuria, no frequency, no hesitancy  PSYCHIATRY: no depression , no anxiety  ALL OTHER  ROS negative        Vital Signs Last 24 Hrs  T(C): 36.6 (2018 04:30), Max: 37.4 (2018 23:36)  T(F): 97.8 (2018 04:30), Max: 99.3 (2018 23:36)  HR: 52 (2018 04:30) (51 - 56)  BP: 103/55 (2018 04:30) (101/51 - 115/58)  BP(mean): --  RR: 18 (2018 04:30) (16 - 18)  SpO2: 95% (2018 04:30) (94% - 100%)    ________________________________________________  PHYSICAL EXAM:  GENERAL: NAD  HEENT: Normocephalic;  conjunctivae and sclerae clear; moist mucous membranes;   NECK : supple  CHEST/LUNG: Clear to auscultation bilaterally with good air entry   HEART: S1 S2  regular; no murmurs, gallops or rubs  ABDOMEN: Soft, Nontender, Nondistended; Bowel sounds present  EXTREMITIES: no cyanosis; no edema; no calf tenderness  SKIN: warm and dry; no rash  NERVOUS SYSTEM:  Awake and alert;  ; no new deficits    _________________________________________________  LABS:                        11.0   9.3   )-----------( 294      ( 2018 15:17 )             36.8         148<H>  |  114<H>  |  44<H>  ----------------------------<  97  3.4<L>   |  22  |  1.58<H>    Ca    8.2<L>      2018 15:17  Mg     2.4         TPro  7.3  /  Alb  2.8<L>  /  TBili  1.6<H>  /  DBili  x   /  AST  31  /  ALT  22  /  AlkPhos  237<H>      PT/INR - ( 2018 15:17 )   PT: 13.8 sec;   INR: 1.24 ratio         PTT - ( 2018 15:17 )  PTT:28.6 sec  Urinalysis Basic - ( 2018 18:03 )    Color: Yellow / Appearance: Clear / S.020 / pH: x  Gluc: x / Ketone: Negative  / Bili: Negative / Urobili: 4   Blood: x / Protein: 15 / Nitrite: Negative   Leuk Esterase: Trace / RBC: 2-5 /HPF / WBC 6-10 /HPF   Sq Epi: x / Non Sq Epi: Few /HPF / Bacteria: Many /HPF      CAPILLARY BLOOD GLUCOSE      POCT Blood Glucose.: 120 mg/dL (2018 14:08)    < from: US Duplex Venous Lower Ext Ltd, Right (.18 @ 20:14) >  IMPRESSION:     No evidence of right lower extremity deep venous thrombosis.        < end of copied text >

## 2018-11-20 NOTE — CONSULT NOTE ADULT - SUBJECTIVE AND OBJECTIVE BOX
HPI: Patient is an 86 year old female who is admitted for medical work up. Patient was noted to have painful and swollen right knee. No known trauma.     PAST MEDICAL & SURGICAL HISTORY:  Dementia  Paroxysmal atrial fibrillation  CKD (chronic kidney disease), stage 3 (moderate)  MI (myocardial infarction)  Paroxysmal atrial fibrillation  Osteoarthritis  HTN (hypertension)  CHF, chronic: EF 40-45%  CAD (coronary artery disease): S/p stent x 4 (remote)  CAD (coronary artery disease): s/p 4 stents  Osteoarthritis  CHF (congestive heart failure): hfref  HTN (hypertension)  Stented coronary artery  No significant past surgical history    Review of systems: Non Contributory    MEDICATIONS  (STANDING):  acetaminophen  IVPB .. 1000 milliGRAM(s) IV Intermittent once  amiodarone    Tablet 200 milliGRAM(s) Oral daily  aspirin  chewable 81 milliGRAM(s) Oral daily  cefTRIAXone   IVPB 1 Gram(s) IV Intermittent every 24 hours  enoxaparin Injectable 30 milliGRAM(s) SubCutaneous daily  ergocalciferol 75439 Unit(s) Oral <User Schedule>  furosemide   Injectable 40 milliGRAM(s) IV Push every 12 hours  influenza   Vaccine 0.5 milliLiter(s) IntraMuscular once  levothyroxine 25 MICROGram(s) Oral daily  methylPREDNISolone acetate Injectable 40 milliGRAM(s) IntraArticular once  metoprolol tartrate 12.5 milliGRAM(s) Oral two times a day  PARoxetine 10 milliGRAM(s) Oral daily    Allergies: azithromycin (Other)  Zithromax (Other)    Vital Signs Last 24 Hrs  T(C): 36.8 (2018 11:24), Max: 37 (2018 23:40)  T(F): 98.3 (2018 11:24), Max: 98.6 (2018 23:40)  HR: 56 (2018 11:24) (48 - 83)  BP: 119/64 (2018 11:24) (95/42 - 119/64)  BP(mean): --  RR: 18 (2018 11:24) (17 - 18)  SpO2: 100% (2018 11:24) (96% - 100%)    Physical Examination:    Musculoskeletal:  Right knee: Positive tenderness to palpation of joint line, 3 + effusion, pain on flexion of the knee, decreased range of motion, positive [facility] maneuver.     Neurovascularly Intact    LABS:                        9.4    6.9   )-----------( 214      ( 2018 07:11 )             32.1         146<H>  |  113<H>  |  49<H>  ----------------------------<  122<H>  3.8   |  25  |  1.55<H>    Ca    8.0<L>      2018 07:11  Phos  3.9       Mg     2.5         TPro  6.8  /  Alb  2.4<L>  /  TBili  1.3<H>  /  DBili  x   /  AST  26  /  ALT  20  /  AlkPhos  199<H>      PT/INR - ( 2018 15:17 )   PT: 13.8 sec;   INR: 1.24 ratio       PTT - ( 2018 15:17 )  PTT:28.6 sec  Urinalysis Basic - ( 2018 18:03 )    Color: Yellow / Appearance: Clear / S.020 / pH: x  Gluc: x / Ketone: Negative  / Bili: Negative / Urobili: 4   Blood: x / Protein: 15 / Nitrite: Negative   Leuk Esterase: Trace / RBC: 2-5 /HPF / WBC 6-10 /HPF   Sq Epi: x / Non Sq Epi: Few /HPF / Bacteria: Many /HPF    RADIOLOGY & ADDITIONAL STUDIES: X-ray of right done in the ER on 2018 shows moderate DJD with joint effusion.     ASSESSMENT: Right knee DJD.     PLAN/RECOMMENDATION: Under sterile condition, right knee was aspirated and about 60 CC of bloody fluid was obtained, and was injected with 40 mg of Depomedrol and 5 CC of lidocaine. Patient tolerated procedure well.     Apply ice. Take analgesics for pain. Apply Voltaren gel.     FOLLOW UP: With office after discharge

## 2018-11-20 NOTE — SWALLOW BEDSIDE ASSESSMENT ADULT - ORAL PHASE
Within functional limits Delayed oral transit time/bolus holding Delayed oral transit time/bolus holding, oral residue

## 2018-11-20 NOTE — PROGRESS NOTE ADULT - SUBJECTIVE AND OBJECTIVE BOX
PRESENTING CC:    SUBJ: 86 year old female from home lives with daughter, walks with a walker with PMH of Severe Dementia, Afib (not on AC by choice), CHF echo in 2017 showed severe LV global dysfunction ,Intubations x 4 in past for CHF, CAD s/p 5 stents , HTN, osteoarthritis was brought to the ED by daughter for inability to walk and poor PO in take x 1 week      PMH -reviewed admission note, no change since admission  Heart failure: acute [ ] chronic [ ] acute or chronic [x ] diastolic [ ] systolic [x ] combined systolic and diastolic[ ]  MIREYA: ATN[ ] renal medullary necrosis [ ] CKD I [ ]CKDII [ ]CKD III [ ]CKD IV [ ]CKD V [ ]Other pathological lesions [ ]    MEDICATIONS  (STANDING):  acetaminophen  IVPB .. 1000 milliGRAM(s) IV Intermittent once  amiodarone    Tablet 200 milliGRAM(s) Oral daily  aspirin  chewable 81 milliGRAM(s) Oral daily  cefTRIAXone   IVPB 1 Gram(s) IV Intermittent every 24 hours  enoxaparin Injectable 30 milliGRAM(s) SubCutaneous daily  furosemide   Injectable 40 milliGRAM(s) IV Push every 12 hours  influenza   Vaccine 0.5 milliLiter(s) IntraMuscular once  metoprolol tartrate 12.5 milliGRAM(s) Oral two times a day  PARoxetine 10 milliGRAM(s) Oral daily    MEDICATIONS  (PRN):  acetaminophen   Tablet .. 650 milliGRAM(s) Oral every 6 hours PRN Temp greater or equal to 38C (100.4F), Moderate Pain (4 - 6)          FAMILY HISTORY:  No pertinent family history in first degree relatives  No pertinent family history in first degree relatives    No family history of premature coronary artery disease or sudden cardiac death      REVIEW OF SYSTEMS:  Constitutional: [ ] fever, [ ]weight loss,  [x ]fatigue  Eyes: [ ] visual changes  Respiratory: [ ]shortness of breath;  [ ] cough, [ ]wheezing, [ ]chills, [ ]hemoptysis  Cardiovascular: [ ] chest pain, [ ]palpitations, [ ]dizziness,  [ ]leg swelling[ ]orthopnea[ ]PND  Gastrointestinal: [ ] abdominal pain, [ ]nausea, [ ]vomiting,  [ ]diarrhea   Genitourinary: [ ] dysuria, [ ] hematuria  Neurologic: [ ] headaches [ ] tremors[ ]weakness  Skin: [ ] itching, [ ]burning, [ ] rashes  Endocrine: [ ] heat or cold intolerance  Musculoskeletal: [ ] joint pain or swelling; [ ] muscle, back, or extremity pain  Psychiatric: [ ] depression, [ ]anxiety, [ ]mood swings, or [ ]difficulty sleeping  Hematologic: [ ] easy bruising, [ ] bleeding gums    [x] All remaining systems negative except as per above.   [ ]Unable to obtain.    Vital Signs Last 24 Hrs  T(C): 36.9 (20 Nov 2018 07:21), Max: 37.4 (19 Nov 2018 23:36)  T(F): 98.4 (20 Nov 2018 07:21), Max: 99.3 (19 Nov 2018 23:36)  HR: 55 (20 Nov 2018 07:21) (51 - 56)  BP: 110/42 (20 Nov 2018 07:21) (101/51 - 115/58)  RR: 18 (20 Nov 2018 07:21) (16 - 18)  SpO2: 100% (20 Nov 2018 07:21) (94% - 100%)  I&O's Summary      PHYSICAL EXAM:  General: No acute distress BMI-24  HEENT: EOMI, PERRL  Neck: Supple, [ ] JVD  Lungs: Equal air entry bilaterally; [ ] rales [ ] wheezing [ ] rhonchi  Heart: Regular rate and rhythm; [x ] murmur   2/6 [x ] systolic [ ] diastolic [ ] radiation[ ] rubs [ ]  gallops  Abdomen: Nontender, bowel sounds present  Extremities: No clubbing, cyanosis, [x ] edema  Nervous system:  Alert & Oriented X3, no focal deficits  Psychiatric: Normal affect  Skin: No rashes or lesions    LABS:  11-20    146<H>  |  113<H>  |  44<H>  ----------------------------<  101<H>  3.8   |  23  |  1.55<H>    Ca    8.1<L>      20 Nov 2018 06:34  Phos  2.6     11-20  Mg     2.5     11-20    TPro  6.8  /  Alb  2.4<L>  /  TBili  1.3<H>  /  DBili  x   /  AST  26  /  ALT  20  /  AlkPhos  199<H>  11-20    Creatinine Trend: 1.55<--, 1.58<--                        10.2   9.5   )-----------( 259      ( 20 Nov 2018 06:34 )             33.9     PT/INR - ( 19 Nov 2018 15:17 )   PT: 13.8 sec;   INR: 1.24 ratio         PTT - ( 19 Nov 2018 15:17 )  PTT:28.6 sec   Serum Pro-Brain Natriuretic Peptide: 37449 pg/mL (11-19 @ 15:17)    Cardiac Enzymes: CARDIAC MARKERS ( 19 Nov 2018 22:54 )  0.074 ng/mL / x     / 26 U/L / x     / 3.3 ng/mL  CARDIAC MARKERS ( 19 Nov 2018 15:17 )  0.094 ng/mL / x     / 222 U/L / x     / x          Serum Pro-Brain Natriuretic Peptide: 51048 pg/mL (11-19-18 @ 15:17)          IMPRESSION AND PLAN:    Patient is a 86 year old female from home lives with daughter, walks with a walker with PMH of Severe Dementia, Afib (not on AC by choice), CHF echo in 2017 showed severe LV global dysfunction ,Intubations x 4 in past for CHF, CAD s/p 5 stents , HTN, osteoarthritis was brought to the ED by daughter for inability to walk and poor PO in take x 1 week      Problem/Plan - 1:  ·  Problem: Failure to thrive in adult.  Plan: -Patient presented with poor PO in take, inability to walk  -could be secondary to infectious etiology, worsening of dementia versus new onset stroke  -Patient's UA is +ve, elevated neutrophils  82.8  -will treated underlying cause and monitor for improvement       Problem/Plan - 2:  ·  Problem: CHF exacerbation.  Plan: -Patient presented with Poor PO in take, but denies chest pain or SOB  -CXR looks congested with elevated pro BNP.   -will start on Lasix 40 IV BID as patient takes 80 mg po daily at Home      Problem/Plan - 3:  Problem: Atrial fibrillation. Plan: -Patient is on metoprolol 12.5 BID  -on no AC, Only on Plavix and aspirn,

## 2018-11-20 NOTE — PROGRESS NOTE ADULT - PROBLEM SELECTOR PLAN 8
-c/w aspirin, statin and BB -Patient's troponins are elevated to 0.09  -Patient has CHF, denies any chest Pain  -EKG is NSR, with LBBB. Last cardiac cath in Nov 2016 s/p 5th drug eluting stent placement for stenosis of Circumflex artery ,100% stenosis of LAD.  -likely demand ischemia from CHF  -will c/w aspirin, metoprolol and atorvastatin  -will hold Plavix as it has been > 1 year since her last stent.   - troponin trended down

## 2018-11-20 NOTE — PROGRESS NOTE ADULT - PROBLEM SELECTOR PLAN 7
-Patient's troponins are elevated to 0.09  -Patient has CHF, denies any chest Pain  -EKG is NSR, with LBBB. Last cardiac cath in Nov 2016 s/p 5th drug eluting stent placement for stenosis of Circumflex artery ,100% stenosis of LAD.  -likely demand ischemia from CHF  -will c/w aspirin, metoprolol and atorvastatin  -will hold Plavix as it has been > 1 year since her last stent.   - troponin trending down -Patient's troponins are elevated to 0.09  -Patient has CHF, denies any chest Pain  -EKG is NSR, with LBBB. Last cardiac cath in Nov 2016 s/p 5th drug eluting stent placement for stenosis of Circumflex artery ,100% stenosis of LAD.  -likely demand ischemia from CHF  -will c/w aspirin, metoprolol and atorvastatin  -will hold Plavix as it has been > 1 year since her last stent.   - troponin trended down -Patient is on metoprolol 12.5 BID, amiodarone 200mg OD  -on no AC, Only on Plavix and aspirin, Aspirin was held by daughter 10 days ago after she fell  -will resume Aspirin, will hold Plavix given bruises, Pain and inability to move right lower extremity, until rule out hematoma

## 2018-11-20 NOTE — CONSULT NOTE ADULT - PROBLEM SELECTOR RECOMMENDATION 9
r/o hypothyroidism  ? amiodorone induced  nl tsh in the past  repaet tsh and check ft4  consider low dose lt4

## 2018-11-20 NOTE — SWALLOW BEDSIDE ASSESSMENT ADULT - SLP PERTINENT HISTORY OF CURRENT PROBLEM
86F from home, lives w/ daughter, walks w/ walker; PMHx severe dementia, Afib, CHF echo in 2017 revealed severe LV global dysfunction, intubations x4 in past for CHF, CAD s/p 5 stents, HTN, osteoarthritis; brought to ED by daughter 2/2 inability to walk, poor PO intake, urinary incontinence x1 week; admitted for [facility] & UTI.

## 2018-11-20 NOTE — PROGRESS NOTE ADULT - PROBLEM SELECTOR PLAN 5
-c/w Home dose of paroxetine  -will get Psych consult, Patient may benefit from Mirtazapine -c/w Home dose of paroxetine -Has a Positive UA  -could be a cause of worsening mental status  -will treat empirically with rocephin until culture returns

## 2018-11-20 NOTE — PROGRESS NOTE ADULT - PROBLEM SELECTOR PLAN 6
-Patient is on metoprolol 12.5 BID  -on no AC, Only on Plavix and aspirin, Aspirin was held by daughter 10 days ago after she fell  -will resume Aspirin, will hold Plavix given bruises, Pain and inability to move right lower extremity, until rule out hematoma -Patient is on metoprolol 12.5 BID, amiodarone 200mg OD  -on no AC, Only on Plavix and aspirin, Aspirin was held by daughter 10 days ago after she fell  -will resume Aspirin, will hold Plavix given bruises, Pain and inability to move right lower extremity, until rule out hematoma -c/w Home dose of paroxetine

## 2018-11-20 NOTE — SWALLOW BEDSIDE ASSESSMENT ADULT - SWALLOW EVAL: DIAGNOSIS
Pt p/w oropharyngeal dysphagia c/b prolonged mastication, delayed oral transit, bolus holding, delayed swallow trigger, & reduced hyolaryngeal elevation/excursion. No overt s/s of penetration/aspiration noted at this exam.

## 2018-11-20 NOTE — CONSULT NOTE ADULT - SUBJECTIVE AND OBJECTIVE BOX
Patient is a 86y old  Female who presents with a chief complaint of FTT , CHF (2018 09:12)      HPI:  Patient is a 86 year old female from home lives with daughter, walks with a walker with PMH of Severe Dementia, Afib (not on AC by choice), CHF echo in 2017 showed severe LV global dysfunction ,Intubations x 4 in past for CHF, CAD s/p 5 stents , HTN, osteoarthritis was brought to the ED by daughter for inability to walk and poor PO in take x 1 week. Patient is a very poor historian and history was obtained by daughter Guzman at bed side. Patient's daughter reported that 1 week ago, Patient was able to walk with her walker at Home and is more alert, but now she has stopped walking and stays in her bed most of the time. She also has urinary incontinence. Patient slipped from her bed 2 weeks ago and landed on her side. it was an unwitnessed fall. Daughter reports that patient is been drinking a lot of water, but her PO in take is almost none. Patient's daughter hasn't noted any shortness of breath, fever, chest pain. Patient has chronic right leg swelling, but now she has new bruises on her right leg, for which she held her aspirin after speaking with her PMD.     In ED Patient; s vitals are stable and she is in no acute distress. Patient is AAO x 1 to self, she is unable to follow commands and resident was unable to perform complete neurological exam. However strength is 5/5 in bilateral right upper extremities, she can lift her left lower extremity against gravity, but unable to move right lower extremity. CXR shows [place] [address], Troponin 0.094. UA is positive. EKG is NSR with LBBB HR 63 b/min.   GOC: Patient is DNR/DNI. Daughter is surrogate and doesn't want her Mother to suffer. (2018 18:30)      PAST MEDICAL & SURGICAL HISTORY:  Dementia  Paroxysmal atrial fibrillation  CKD (chronic kidney disease), stage 3 (moderate)  MI (myocardial infarction)  Paroxysmal atrial fibrillation  Osteoarthritis  HTN (hypertension)  CHF, chronic: EF 40-45%  CAD (coronary artery disease): S/p stent x 4 (remote)  CAD (coronary artery disease): s/p 4 stents  Osteoarthritis  CHF (congestive heart failure): hfref  HTN (hypertension)  Stented coronary artery  No significant past surgical history         MEDICATIONS  (STANDING):  acetaminophen  IVPB .. 1000 milliGRAM(s) IV Intermittent once  aspirin  chewable 81 milliGRAM(s) Oral daily  cefTRIAXone   IVPB 1 Gram(s) IV Intermittent every 24 hours  enoxaparin Injectable 30 milliGRAM(s) SubCutaneous daily  furosemide   Injectable 40 milliGRAM(s) IV Push every 12 hours  influenza   Vaccine 0.5 milliLiter(s) IntraMuscular once  levothyroxine 25 MICROGram(s) Oral daily  metoprolol tartrate 12.5 milliGRAM(s) Oral two times a day  PARoxetine 10 milliGRAM(s) Oral daily    MEDICATIONS  (PRN):  acetaminophen   Tablet .. 650 milliGRAM(s) Oral every 6 hours PRN Temp greater or equal to 38C (100.4F), Moderate Pain (4 - 6)      FAMILY HISTORY:  No pertinent family history in first degree relatives  No pertinent family history in first degree relatives      SOCIAL HISTORY:      REVIEW OF SYSTEMS:  CONSTITUTIONAL: No fever, weight loss, or fatigue  EYES: No eye pain, visual disturbances, or discharge  ENT:  No difficulty hearing, tinnitus, vertigo; No sinus or throat pain  NECK: No pain or stiffness  RESPIRATORY: No cough, wheezing, chills or hemoptysis; No Shortness of Breath  CARDIOVASCULAR: No chest pain, palpitations, passing out, dizziness, or leg swelling  GASTROINTESTINAL: No abdominal or epigastric pain. No nausea, vomiting, or hematemesis; No diarrhea or constipation. No melena or hematochezia.  GENITOURINARY: No dysuria, frequency, hematuria, or incontinence  NEUROLOGICAL: No headaches, memory loss, loss of strength, numbness, or tremors  SKIN: No itching, burning, rashes, or lesions   LYMPH Nodes: No enlarged glands  ENDOCRINE: No heat or cold intolerance; No hair loss  MUSCULOSKELETAL: No joint pain or swelling; No muscle, back, or extremity pain  PSYCHIATRIC: No depression, anxiety, mood swings, or difficulty sleeping  HEME/LYMPH: No easy bruising, or bleeding gums  ALLERGY AND IMMUNOLOGIC: No hives or eczema	        Vital Signs Last 24 Hrs  T(C): 36.9 (2018 07:21), Max: 37.4 (2018 23:36)  T(F): 98.4 (2018 07:21), Max: 99.3 (2018 23:36)  HR: 55 (2018 07:21) (51 - 56)  BP: 110/42 (2018 07:21) (101/51 - 115/58)  BP(mean): --  RR: 18 (2018 07:21) (16 - 18)  SpO2: 100% (2018 07:21) (94% - 100%)      Constitutional:    NC/AT:    HEENT:    Neck:  No JVD, bruits or thyromegaly    Respiratory:  Clear without rales or rhonchi    Cardiovascular:  RR without murmur, rub or gallop.    Gastrointestinal: Soft without hepatosplenomegaly.    Extremities: without cyanosis, clubbing or edema.    Neurological:  Oriented   x      . No gross sensory or motor defects.        LABS:                        10.2   9.5   )-----------( 259      ( 2018 06:34 )             33.9     11-20    146<H>  |  113<H>  |  44<H>  ----------------------------<  101<H>  3.8   |  23  |  1.55<H>    Ca    8.1<L>      2018 06:34  Phos  2.6     11-20  Mg     2.5     11-20    TPro  6.8  /  Alb  2.4<L>  /  TBili  1.3<H>  /  DBili  x   /  AST  26  /  ALT  20  /  AlkPhos  199<H>  11-20    CARDIAC MARKERS ( 2018 22:54 )  0.074 ng/mL / x     / 26 U/L / x     / 3.3 ng/mL  CARDIAC MARKERS ( 2018 15:17 )  0.094 ng/mL / x     / 222 U/L / x     / x          PT/INR - ( 2018 15:17 )   PT: 13.8 sec;   INR: 1.24 ratio         PTT - ( 2018 15:17 )  PTT:28.6 sec  Urinalysis Basic - ( 2018 18:03 )    Color: Yellow / Appearance: Clear / S.020 / pH: x  Gluc: x / Ketone: Negative  / Bili: Negative / Urobili: 4   Blood: x / Protein: 15 / Nitrite: Negative   Leuk Esterase: Trace / RBC: 2-5 /HPF / WBC 6-10 /HPF   Sq Epi: x / Non Sq Epi: Few /HPF / Bacteria: Many /HPF      CAPILLARY BLOOD GLUCOSE      POCT Blood Glucose.: 120 mg/dL (2018 14:08)      RADIOLOGY & ADDITIONAL STUDIES: Patient is a 86y old  Female who presents with a chief complaint of FTT , CHF (2018 09:12)      HPI:  Patient is a 86 year old female from home lives with daughter, walks with a walker with PMH of Severe Dementia, Afib (not on AC by choice), CHF echo in 2017 showed severe LV global dysfunction ,Intubations x 4 in past for CHF, CAD s/p 5 stents , HTN, osteoarthritis was brought to the ED by daughter for inability to walk and poor PO in take x 1 week. Patient is a very poor historian and history was obtained by daughter Guzman at bed side. Patient's daughter reported that 1 week ago, Patient was able to walk with her walker at Home and is more alert, but now she has stopped walking and stays in her bed most of the time. She also has urinary incontinence. Patient slipped from her bed 2 weeks ago and landed on her side. it was an unwitnessed fall. Daughter reports that patient is been drinking a lot of water, but her PO in take is almost none. Patient's daughter hasn't noted any shortness of breath, fever, chest pain. Patient has chronic right leg swelling, but now she has new bruises on her right leg, for which she held her aspirin after speaking with her PMD.     In ED Patient; s vitals are stable and she is in no acute distress. Patient is AAO x 1 to self, she is unable to follow commands and resident was unable to perform complete neurological exam. However strength is 5/5 in bilateral right upper extremities, she can lift her left lower extremity against gravity, but unable to move right lower extremity. CXR shows fluid overload and [place] [address], Troponin 0.094. UA is positive. EKG is NSR with LBBB HR 63 b/min.   GOC: Patient is DNR/DNI. Daughter is surrogate and doesn't want her Mother to suffer. (2018 18:30)      PAST MEDICAL & SURGICAL HISTORY:  Dementia  Paroxysmal atrial fibrillation  CKD (chronic kidney disease), stage 3 (moderate)  MI (myocardial infarction)  Paroxysmal atrial fibrillation  Osteoarthritis  HTN (hypertension)  CHF, chronic: EF 40-45%  CAD (coronary artery disease): S/p stent x 4 (remote)  CAD (coronary artery disease): s/p 4 stents  Osteoarthritis  CHF (congestive heart failure): hfref  HTN (hypertension)  Stented coronary artery  No significant past surgical history         MEDICATIONS  (STANDING):  acetaminophen  IVPB .. 1000 milliGRAM(s) IV Intermittent once  aspirin  chewable 81 milliGRAM(s) Oral daily  cefTRIAXone   IVPB 1 Gram(s) IV Intermittent every 24 hours  enoxaparin Injectable 30 milliGRAM(s) SubCutaneous daily  furosemide   Injectable 40 milliGRAM(s) IV Push every 12 hours  influenza   Vaccine 0.5 milliLiter(s) IntraMuscular once  levothyroxine 25 MICROGram(s) Oral daily  metoprolol tartrate 12.5 milliGRAM(s) Oral two times a day  PARoxetine 10 milliGRAM(s) Oral daily    MEDICATIONS  (PRN):  acetaminophen   Tablet .. 650 milliGRAM(s) Oral every 6 hours PRN Temp greater or equal to 38C (100.4F), Moderate Pain (4 - 6)      FAMILY HISTORY:  No pertinent family history in first degree relatives  No pertinent family history in first degree relatives      SOCIAL HISTORY:      REVIEW OF SYSTEMS:  CONSTITUTIONAL: No fever, weight loss, or fatigue  EYES: No eye pain, visual disturbances, or discharge  ENT:  No difficulty hearing, tinnitus, vertigo; No sinus or throat pain  NECK: No pain or stiffness  RESPIRATORY: No cough, wheezing, chills or hemoptysis; No Shortness of Breath  CARDIOVASCULAR: No chest pain, palpitations, passing out, dizziness, or leg swelling  GASTROINTESTINAL: No abdominal or epigastric pain. No nausea, vomiting, or hematemesis; No diarrhea or constipation. No melena or hematochezia.  GENITOURINARY: No dysuria, frequency, hematuria, or incontinence  NEUROLOGICAL: No headaches, memory loss, loss of strength, numbness, or tremors  SKIN: No itching, burning, rashes, or lesions   LYMPH Nodes: No enlarged glands  ENDOCRINE: No heat or cold intolerance; No hair loss  MUSCULOSKELETAL: No joint pain or swelling; No muscle, back, or extremity pain  PSYCHIATRIC: No depression, anxiety, mood swings, or difficulty sleeping  HEME/LYMPH: No easy bruising, or bleeding gums  ALLERGY AND IMMUNOLOGIC: No hives or eczema	        Vital Signs Last 24 Hrs  T(C): 36.9 (2018 07:21), Max: 37.4 (2018 23:36)  T(F): 98.4 (2018 07:21), Max: 99.3 (2018 23:36)  HR: 55 (2018 07:21) (51 - 56)  BP: 110/42 (2018 07:21) (101/51 - 115/58)  BP(mean): --  RR: 18 (2018 07:21) (16 - 18)  SpO2: 100% (2018 07:21) (94% - 100%)      Constitutional:      HEENT: nad    Neck:  No JVD, bruits or thyromegaly    Respiratory:  Clear without rales or rhonchi    Cardiovascular:  RR without murmur, rub or gallop.    Gastrointestinal: Soft without hepatosplenomegaly.    Extremities: without cyanosis, clubbing  +edema.    Neurological:  Oriented   x   1   . No gross sensory or motor defects.        LABS:                        10.2   9.5   )-----------( 259      ( 2018 06:34 )             33.9     11-20    146<H>  |  113<H>  |  44<H>  ----------------------------<  101<H>  3.8   |  23  |  1.55<H>    Ca    8.1<L>      2018 06:34  Phos  2.6     11-20  Mg     2.5     11-20    TPro  6.8  /  Alb  2.4<L>  /  TBili  1.3<H>  /  DBili  x   /  AST  26  /  ALT  20  /  AlkPhos  199<H>  11-20    CARDIAC MARKERS ( 2018 22:54 )  0.074 ng/mL / x     / 26 U/L / x     / 3.3 ng/mL  CARDIAC MARKERS ( 2018 15:17 )  0.094 ng/mL / x     / 222 U/L / x     / x          PT/INR - ( 2018 15:17 )   PT: 13.8 sec;   INR: 1.24 ratio         PTT - ( 2018 15:17 )  PTT:28.6 sec  Urinalysis Basic - ( 2018 18:03 )    Color: Yellow / Appearance: Clear / S.020 / pH: x  Gluc: x / Ketone: Negative  / Bili: Negative / Urobili: 4   Blood: x / Protein: 15 / Nitrite: Negative   Leuk Esterase: Trace / RBC: 2-5 /HPF / WBC 6-10 /HPF   Sq Epi: x / Non Sq Epi: Few /HPF / Bacteria: Many /HPF    Thyroid Stimulating Hormone, Serum (11.20.18 @ 06:34)    Thyroid Stimulating Hormone, Serum: 37.30 uU/mL    Thyroid Stimulating Hormone, Serum (11.22.16 @ 06:57)    Thyroid Stimulating Hormone, Serum: 3.39 uIU/mL        CAPILLARY BLOOD GLUCOSE      POCT Blood Glucose.: 120 mg/dL (2018 14:08)      RADIOLOGY & ADDITIONAL STUDIES:

## 2018-11-20 NOTE — SWALLOW BEDSIDE ASSESSMENT ADULT - SWALLOW EVAL: RECOMMENDED FEEDING/EATING TECHNIQUES
alternate food with liquid/allow for swallow between intakes/position upright (90 degrees)/maintain upright posture during/after eating for 30 mins/oral hygiene/small sips/bites

## 2018-11-20 NOTE — PROGRESS NOTE ADULT - PROBLEM SELECTOR PLAN 4
-Patient has worsening of mental status  -Has a Positive UA  -could be a cause of worsening mental status  -will treat empirically with rocephin until culture returns -Has a Positive UA  -could be a cause of worsening mental status  -will treat empirically with rocephin until culture returns -chronic right leg swelling, but new bruising s/p fall   -cannot move extremity 2/2 to pain   -X ray shows large supra patellar effusion.   -refused CT scan of left lower leg to rule out any fracture or hematoma   -dopplers of lower extremity normal  -consulted orthopedics for evaluation of large supra patellar effusion

## 2018-11-20 NOTE — PROGRESS NOTE ADULT - SUBJECTIVE AND OBJECTIVE BOX
PGY 1 Note discussed with supervising resident and primary attending    Patient is a 86y old  Female who presents with a chief complaint of FTT , CHF (2018 18:30)      INTERVAL HPI/OVERNIGHT EVENTS:     MEDICATIONS  (STANDING):  acetaminophen  IVPB .. 1000 milliGRAM(s) IV Intermittent once  amiodarone    Tablet 200 milliGRAM(s) Oral daily  aspirin  chewable 81 milliGRAM(s) Oral daily  cefTRIAXone   IVPB 1 Gram(s) IV Intermittent every 24 hours  enoxaparin Injectable 30 milliGRAM(s) SubCutaneous daily  furosemide   Injectable 40 milliGRAM(s) IV Push every 12 hours  influenza   Vaccine 0.5 milliLiter(s) IntraMuscular once  metoprolol tartrate 12.5 milliGRAM(s) Oral two times a day  PARoxetine 10 milliGRAM(s) Oral daily    MEDICATIONS  (PRN):  acetaminophen   Tablet .. 650 milliGRAM(s) Oral every 6 hours PRN Temp greater or equal to 38C (100.4F), Moderate Pain (4 - 6)      __________________________________________________  REVIEW OF SYSTEMS:    CONSTITUTIONAL: No fever,   EYES: no acute visual disturbances  NECK: No pain or stiffness  RESPIRATORY: No cough; No shortness of breath  CARDIOVASCULAR: No chest pain, no palpitations  GASTROINTESTINAL: No pain. No nausea or vomiting; No diarrhea   NEUROLOGICAL: No headache or numbness, no tremors  MUSCULOSKELETAL: No joint pain, no muscle pain  GENITOURINARY: no dysuria, no frequency, no hesitancy  PSYCHIATRY: no depression , no anxiety  ALL OTHER  ROS negative        Vital Signs Last 24 Hrs  T(C): 36.6 (2018 04:30), Max: 37.4 (2018 23:36)  T(F): 97.8 (2018 04:30), Max: 99.3 (2018 23:36)  HR: 52 (2018 04:30) (51 - 56)  BP: 103/55 (2018 04:30) (101/51 - 115/58)  BP(mean): --  RR: 18 (2018 04:30) (16 - 18)  SpO2: 95% (2018 04:30) (94% - 100%)    ________________________________________________  PHYSICAL EXAM:  GENERAL: NAD  HEENT: Normocephalic;  conjunctivae and sclerae clear; moist mucous membranes;   NECK : supple  CHEST/LUNG: Clear to auscultation bilaterally with good air entry   HEART: S1 S2  regular; no murmurs, gallops or rubs  ABDOMEN: Soft, Nontender, Nondistended; Bowel sounds present  EXTREMITIES: no cyanosis; no edema; no calf tenderness  SKIN: warm and dry; no rash  NERVOUS SYSTEM:  Awake and alert;  ; no new deficits    _________________________________________________  LABS:                        11.0   9.3   )-----------( 294      ( 2018 15:17 )             36.8         148<H>  |  114<H>  |  44<H>  ----------------------------<  97  3.4<L>   |  22  |  1.58<H>    Ca    8.2<L>      2018 15:17  Mg     2.4         TPro  7.3  /  Alb  2.8<L>  /  TBili  1.6<H>  /  DBili  x   /  AST  31  /  ALT  22  /  AlkPhos  237<H>      PT/INR - ( 2018 15:17 )   PT: 13.8 sec;   INR: 1.24 ratio         PTT - ( 2018 15:17 )  PTT:28.6 sec  Urinalysis Basic - ( 2018 18:03 )    Color: Yellow / Appearance: Clear / S.020 / pH: x  Gluc: x / Ketone: Negative  / Bili: Negative / Urobili: 4   Blood: x / Protein: 15 / Nitrite: Negative   Leuk Esterase: Trace / RBC: 2-5 /HPF / WBC 6-10 /HPF   Sq Epi: x / Non Sq Epi: Few /HPF / Bacteria: Many /HPF      CAPILLARY BLOOD GLUCOSE      POCT Blood Glucose.: 120 mg/dL (2018 14:08)        RADIOLOGY & ADDITIONAL TESTS:    Imaging Personally Reviewed:  YES/NO    Consultant(s) Notes Reviewed:   YES/ No    Care Discussed with Consultants :     Plan of care was discussed with patient and /or primary care giver; all questions and concerns were addressed and care was aligned with patient's wishes. PGY 1 Note discussed with supervising resident and primary attending    Patient is a 86y old  Female who presents with a chief complaint of FTT , CHF (2018 18:30)      INTERVAL HPI/OVERNIGHT EVENTS: no acute overnight events    MEDICATIONS  (STANDING):  acetaminophen  IVPB .. 1000 milliGRAM(s) IV Intermittent once  amiodarone    Tablet 200 milliGRAM(s) Oral daily  aspirin  chewable 81 milliGRAM(s) Oral daily  cefTRIAXone   IVPB 1 Gram(s) IV Intermittent every 24 hours  enoxaparin Injectable 30 milliGRAM(s) SubCutaneous daily  furosemide   Injectable 40 milliGRAM(s) IV Push every 12 hours  influenza   Vaccine 0.5 milliLiter(s) IntraMuscular once  metoprolol tartrate 12.5 milliGRAM(s) Oral two times a day  PARoxetine 10 milliGRAM(s) Oral daily    MEDICATIONS  (PRN):  acetaminophen   Tablet .. 650 milliGRAM(s) Oral every 6 hours PRN Temp greater or equal to 38C (100.4F), Moderate Pain (4 - 6)      __________________________________________________  REVIEW OF SYSTEMS:    CONSTITUTIONAL: No fever,   EYES: no acute visual disturbances  NECK: No pain or stiffness  RESPIRATORY: No cough; No shortness of breath  CARDIOVASCULAR: No chest pain, no palpitations  GASTROINTESTINAL: No pain. No nausea or vomiting; No diarrhea   NEUROLOGICAL: No headache or numbness, no tremors  MUSCULOSKELETAL: No joint pain, no muscle pain  GENITOURINARY: no dysuria, no frequency, no hesitancy  PSYCHIATRY: no depression , no anxiety  ALL OTHER  ROS negative        Vital Signs Last 24 Hrs  T(C): 36.6 (2018 04:30), Max: 37.4 (2018 23:36)  T(F): 97.8 (2018 04:30), Max: 99.3 (2018 23:36)  HR: 52 (2018 04:30) (51 - 56)  BP: 103/55 (2018 04:30) (101/51 - 115/58)  BP(mean): --  RR: 18 (2018 04:30) (16 - 18)  SpO2: 95% (2018 04:30) (94% - 100%)    ________________________________________________  PHYSICAL EXAM:  GENERAL: NAD  HEENT: Normocephalic;  conjunctivae and sclerae clear; moist mucous membranes;   NECK : supple  CHEST/LUNG: Clear to auscultation bilaterally with good air entry   HEART: S1 S2  regular; no murmurs, gallops or rubs  ABDOMEN: Soft, Nontender, Nondistended; Bowel sounds present  EXTREMITIES: no cyanosis; no edema; no calf tenderness, bruise over Rt. knee  SKIN: warm and dry; no rash  NERVOUS SYSTEM:  Awake and alert;  ; no new deficits    _________________________________________________  LABS:                        11.0   9.3   )-----------( 294      ( 2018 15:17 )             36.8         148<H>  |  114<H>  |  44<H>  ----------------------------<  97  3.4<L>   |  22  |  1.58<H>    Ca    8.2<L>      2018 15:17  Mg     2.4         TPro  7.3  /  Alb  2.8<L>  /  TBili  1.6<H>  /  DBili  x   /  AST  31  /  ALT  22  /  AlkPhos  237<H>  -    PT/INR - ( 2018 15:17 )   PT: 13.8 sec;   INR: 1.24 ratio         PTT - ( 2018 15:17 )  PTT:28.6 sec  Urinalysis Basic - ( 2018 18:03 )    Color: Yellow / Appearance: Clear / S.020 / pH: x  Gluc: x / Ketone: Negative  / Bili: Negative / Urobili: 4   Blood: x / Protein: 15 / Nitrite: Negative   Leuk Esterase: Trace / RBC: 2-5 /HPF / WBC 6-10 /HPF   Sq Epi: x / Non Sq Epi: Few /HPF / Bacteria: Many /HPF      CAPILLARY BLOOD GLUCOSE      POCT Blood Glucose.: 120 mg/dL (2018 14:08)        RADIOLOGY & ADDITIONAL TESTS:    Imaging Personally Reviewed:  YES    Consultant(s) Notes Reviewed:   YES    Care Discussed with Consultants : yes    Plan of care was discussed with patient and /or primary care giver; all questions and concerns were addressed and care was aligned with patient's wishes.

## 2018-11-20 NOTE — SWALLOW BEDSIDE ASSESSMENT ADULT - COMMENTS
HOB elevated to 90 degrees, A&Ox1-2 (self, place w/ choice). White film on lingual surface noted, cleared 50% w/ trials.

## 2018-11-21 DIAGNOSIS — R53.81 OTHER MALAISE: ICD-10-CM

## 2018-11-21 DIAGNOSIS — Z51.5 ENCOUNTER FOR PALLIATIVE CARE: ICD-10-CM

## 2018-11-21 DIAGNOSIS — R73.03 PREDIABETES: ICD-10-CM

## 2018-11-21 DIAGNOSIS — I50.9 HEART FAILURE, UNSPECIFIED: ICD-10-CM

## 2018-11-21 LAB
-  AMIKACIN: SIGNIFICANT CHANGE UP
-  AMOXICILLIN/CLAVULANIC ACID: SIGNIFICANT CHANGE UP
-  AMPICILLIN/SULBACTAM: SIGNIFICANT CHANGE UP
-  AMPICILLIN: SIGNIFICANT CHANGE UP
-  AZTREONAM: SIGNIFICANT CHANGE UP
-  CEFAZOLIN: SIGNIFICANT CHANGE UP
-  CEFEPIME: SIGNIFICANT CHANGE UP
-  CEFOXITIN: SIGNIFICANT CHANGE UP
-  CEFTRIAXONE: SIGNIFICANT CHANGE UP
-  CIPROFLOXACIN: SIGNIFICANT CHANGE UP
-  ERTAPENEM: SIGNIFICANT CHANGE UP
-  GENTAMICIN: SIGNIFICANT CHANGE UP
-  IMIPENEM: SIGNIFICANT CHANGE UP
-  LEVOFLOXACIN: SIGNIFICANT CHANGE UP
-  MEROPENEM: SIGNIFICANT CHANGE UP
-  NITROFURANTOIN: SIGNIFICANT CHANGE UP
-  PIPERACILLIN/TAZOBACTAM: SIGNIFICANT CHANGE UP
-  TIGECYCLINE: SIGNIFICANT CHANGE UP
-  TOBRAMYCIN: SIGNIFICANT CHANGE UP
-  TRIMETHOPRIM/SULFAMETHOXAZOLE: SIGNIFICANT CHANGE UP
ANION GAP SERPL CALC-SCNC: 8 MMOL/L — SIGNIFICANT CHANGE UP (ref 5–17)
BASOPHILS # BLD AUTO: 0 K/UL — SIGNIFICANT CHANGE UP (ref 0–0.2)
BASOPHILS NFR BLD AUTO: 0.1 % — SIGNIFICANT CHANGE UP (ref 0–2)
BUN SERPL-MCNC: 49 MG/DL — HIGH (ref 7–18)
CALCIUM SERPL-MCNC: 8 MG/DL — LOW (ref 8.4–10.5)
CHLORIDE SERPL-SCNC: 113 MMOL/L — HIGH (ref 96–108)
CO2 SERPL-SCNC: 25 MMOL/L — SIGNIFICANT CHANGE UP (ref 22–31)
CREAT SERPL-MCNC: 1.55 MG/DL — HIGH (ref 0.5–1.3)
CULTURE RESULTS: SIGNIFICANT CHANGE UP
EOSINOPHIL # BLD AUTO: 0 K/UL — SIGNIFICANT CHANGE UP (ref 0–0.5)
EOSINOPHIL NFR BLD AUTO: 0 % — SIGNIFICANT CHANGE UP (ref 0–6)
GLUCOSE BLDC GLUCOMTR-MCNC: 155 MG/DL — HIGH (ref 70–99)
GLUCOSE SERPL-MCNC: 122 MG/DL — HIGH (ref 70–99)
HCT VFR BLD CALC: 32.1 % — LOW (ref 34.5–45)
HCT VFR BLD CALC: 33.8 % — LOW (ref 34.5–45)
HGB BLD-MCNC: 10 G/DL — LOW (ref 11.5–15.5)
HGB BLD-MCNC: 9.4 G/DL — LOW (ref 11.5–15.5)
LYMPHOCYTES # BLD AUTO: 0.5 K/UL — LOW (ref 1–3.3)
LYMPHOCYTES # BLD AUTO: 6.5 % — LOW (ref 13–44)
MAGNESIUM SERPL-MCNC: 2.5 MG/DL — SIGNIFICANT CHANGE UP (ref 1.6–2.6)
MCHC RBC-ENTMCNC: 25.7 PG — LOW (ref 27–34)
MCHC RBC-ENTMCNC: 26 PG — LOW (ref 27–34)
MCHC RBC-ENTMCNC: 29.4 GM/DL — LOW (ref 32–36)
MCHC RBC-ENTMCNC: 29.5 GM/DL — LOW (ref 32–36)
MCV RBC AUTO: 87.1 FL — SIGNIFICANT CHANGE UP (ref 80–100)
MCV RBC AUTO: 88.6 FL — SIGNIFICANT CHANGE UP (ref 80–100)
METHOD TYPE: SIGNIFICANT CHANGE UP
MONOCYTES # BLD AUTO: 0.2 K/UL — SIGNIFICANT CHANGE UP (ref 0–0.9)
MONOCYTES NFR BLD AUTO: 3.1 % — SIGNIFICANT CHANGE UP (ref 2–14)
NEUTROPHILS # BLD AUTO: 6.3 K/UL — SIGNIFICANT CHANGE UP (ref 1.8–7.4)
NEUTROPHILS NFR BLD AUTO: 90.3 % — HIGH (ref 43–77)
ORGANISM # SPEC MICROSCOPIC CNT: SIGNIFICANT CHANGE UP
ORGANISM # SPEC MICROSCOPIC CNT: SIGNIFICANT CHANGE UP
PHOSPHATE SERPL-MCNC: 3.9 MG/DL — SIGNIFICANT CHANGE UP (ref 2.5–4.5)
PLATELET # BLD AUTO: 214 K/UL — SIGNIFICANT CHANGE UP (ref 150–400)
PLATELET # BLD AUTO: 277 K/UL — SIGNIFICANT CHANGE UP (ref 150–400)
POTASSIUM SERPL-MCNC: 3.8 MMOL/L — SIGNIFICANT CHANGE UP (ref 3.5–5.3)
POTASSIUM SERPL-SCNC: 3.8 MMOL/L — SIGNIFICANT CHANGE UP (ref 3.5–5.3)
RBC # BLD: 3.63 M/UL — LOW (ref 3.8–5.2)
RBC # BLD: 3.88 M/UL — SIGNIFICANT CHANGE UP (ref 3.8–5.2)
RBC # FLD: 17.2 % — HIGH (ref 10.3–14.5)
RBC # FLD: 17.5 % — HIGH (ref 10.3–14.5)
SODIUM SERPL-SCNC: 146 MMOL/L — HIGH (ref 135–145)
SPECIMEN SOURCE: SIGNIFICANT CHANGE UP
T3FREE SERPL-MCNC: 1.01 PG/ML — LOW (ref 1.8–4.6)
T4 FREE SERPL-MCNC: 0.9 NG/DL — SIGNIFICANT CHANGE UP (ref 0.9–1.8)
TSH SERPL-MCNC: 29.9 UU/ML — HIGH (ref 0.34–4.82)
WBC # BLD: 6.9 K/UL — SIGNIFICANT CHANGE UP (ref 3.8–10.5)
WBC # BLD: 9.2 K/UL — SIGNIFICANT CHANGE UP (ref 3.8–10.5)
WBC # FLD AUTO: 6.9 K/UL — SIGNIFICANT CHANGE UP (ref 3.8–10.5)
WBC # FLD AUTO: 9.2 K/UL — SIGNIFICANT CHANGE UP (ref 3.8–10.5)

## 2018-11-21 PROCEDURE — 99223 1ST HOSP IP/OBS HIGH 75: CPT

## 2018-11-21 PROCEDURE — 71045 X-RAY EXAM CHEST 1 VIEW: CPT | Mod: 26

## 2018-11-21 RX ORDER — CHOLECALCIFEROL (VITAMIN D3) 125 MCG
1000 CAPSULE ORAL DAILY
Qty: 0 | Refills: 0 | Status: DISCONTINUED | OUTPATIENT
Start: 2018-11-21 | End: 2018-11-21

## 2018-11-21 RX ORDER — ERGOCALCIFEROL 1.25 MG/1
50000 CAPSULE ORAL
Qty: 0 | Refills: 0 | Status: DISCONTINUED | OUTPATIENT
Start: 2018-11-21 | End: 2018-11-26

## 2018-11-21 RX ORDER — INSULIN LISPRO 100/ML
VIAL (ML) SUBCUTANEOUS
Qty: 0 | Refills: 0 | Status: DISCONTINUED | OUTPATIENT
Start: 2018-11-21 | End: 2018-11-26

## 2018-11-21 RX ADMIN — AMIODARONE HYDROCHLORIDE 200 MILLIGRAM(S): 400 TABLET ORAL at 05:45

## 2018-11-21 RX ADMIN — Medication 81 MILLIGRAM(S): at 11:05

## 2018-11-21 RX ADMIN — Medication 25 MICROGRAM(S): at 05:45

## 2018-11-21 RX ADMIN — CEFTRIAXONE 100 GRAM(S): 500 INJECTION, POWDER, FOR SOLUTION INTRAMUSCULAR; INTRAVENOUS at 22:56

## 2018-11-21 RX ADMIN — Medication 12.5 MILLIGRAM(S): at 05:52

## 2018-11-21 RX ADMIN — ENOXAPARIN SODIUM 30 MILLIGRAM(S): 100 INJECTION SUBCUTANEOUS at 11:05

## 2018-11-21 RX ADMIN — Medication 10 MILLIGRAM(S): at 11:05

## 2018-11-21 RX ADMIN — ERGOCALCIFEROL 50000 UNIT(S): 1.25 CAPSULE ORAL at 11:05

## 2018-11-21 RX ADMIN — Medication 40 MILLIGRAM(S): at 05:45

## 2018-11-21 RX ADMIN — Medication 2: at 17:27

## 2018-11-21 NOTE — PROGRESS NOTE ADULT - SUBJECTIVE AND OBJECTIVE BOX
patient seen  and examined a    Patient is a 86y old  Female who presents with a chief complaint of FTT , CHF (2018 18:30)      Is better  but still very weak, not eating well  .MEDICATIONS  (STANDING):  acetaminophen  IVPB .. 1000 milliGRAM(s) IV Intermittent once  amiodarone    Tablet 200 milliGRAM(s) Oral daily  aspirin  chewable 81 milliGRAM(s) Oral daily  cefTRIAXone   IVPB 1 Gram(s) IV Intermittent every 24 hours  cholecalciferol 1000 Unit(s) Oral daily  enoxaparin Injectable 30 milliGRAM(s) SubCutaneous daily  furosemide   Injectable 40 milliGRAM(s) IV Push every 12 hours  influenza   Vaccine 0.5 milliLiter(s) IntraMuscular once  levothyroxine 25 MICROGram(s) Oral daily  methylPREDNISolone acetate Injectable 40 milliGRAM(s) IntraArticular once  metoprolol tartrate 12.5 milliGRAM(s) Oral two times a day  PARoxetine 10 milliGRAM(s) Oral daily    MEDICATIONS  (PRN):  acetaminophen   Tablet .. 650 milliGRAM(s) Oral every 6 hours PRN Temp greater or equal to 38C (100.4F), Moderate Pain (4 - 6)      __________________________________________________  REVIEW OF SYSTEMS:    CONSTITUTIONAL: No fever,   EYES: no acute visual disturbances  NECK: No pain or stiffness  RESPIRATORY: No cough; No shortness of breath  CARDIOVASCULAR: No chest pain, no palpitations  GASTROINTESTINAL: No pain. No nausea or vomiting; No diarrhea   NEUROLOGICAL: No headache or numbness, no tremors  MUSCULOSKELETAL: No joint pain, no muscle pain  GENITOURINARY: no dysuria, no frequency, no hesitancy  PSYCHIATRY: no depression , no anxiety  ALL OTHER  ROS negative      .Vital Signs Last 24 Hrs  T(C): 36.9 (2018 07:57), Max: 37 (2018 23:40)  T(F): 98.4 (2018 07:57), Max: 98.6 (2018 23:40)  HR: 50 (2018 07:57) (48 - 83)  BP: 100/54 (2018 07:57) (95/42 - 116/61)  BP(mean): --  RR: 18 (2018 07:57) (17 - 18)  SpO2: 99% (2018 07:57) (95% - 100%)  ________________________________________________  PHYSICAL EXAM:  GENERAL: NAD  HEENT: Normocephalic;  conjunctivae and sclerae clear; moist mucous membranes;   NECK : supple  CHEST/LUNG: Clear to auscultation bilaterally with good air entry   HEART: S1 S2  regular; no murmurs, gallops or rubs  ABDOMEN: Soft, Nontender, Nondistended; Bowel sounds present  EXTREMITIES: no cyanosis; no edema; no calf tenderness  SKIN: warm and dry; no rash  NERVOUS SYSTEM:  Awake and alert;  ; no new deficits    _________________________________________________  LABS:                        9.4    6.9   )-----------( 214      ( 2018 07:11 )             32.1     11-    146<H>  |  113<H>  |  49<H>  ----------------------------<  122<H>  3.8   |  25  |  1.55<H>    Ca    8.0<L>      2018 07:11  Phos  3.9       Mg     2.5     -    TPro  6.8  /  Alb  2.4<L>  /  TBili  1.3<H>  /  DBili  x   /  AST  26  /  ALT  20  /  AlkPhos  199<H>  11-20    PT/INR - ( 2018 15:17 )   PT: 13.8 sec;   INR: 1.24 ratio         PTT - ( 2018 15:17 )  PTT:28.6 sec  Urinalysis Basic - ( 2018 18:03 )    Color: Yellow / Appearance: Clear / S.020 / pH: x  Gluc: x / Ketone: Negative  / Bili: Negative / Urobili: 4   Blood: x / Protein: 15 / Nitrite: Negative   Leuk Esterase: Trace / RBC: 2-5 /HPF / WBC 6-10 /HPF   Sq Epi: x / Non Sq Epi: Few /HPF / Bacteria: Many /HPF        CARDIAC MARKERS ( 2018 22:54 )  0.074 ng/mL / x     / 26 U/L / x     / 3.3 ng/mL  CARDIAC MARKERS ( 2018 15:17 )  0.094 ng/mL / x     / 222 U/L / x     / x            Serum Pro-Brain Natriuretic Peptide: 30603 pg/mL (18 @ 15:17)      < from: Transthoracic Echocardiogram (18 @ 07:14) >  Derived Variables:  LVMI: 102 g/m2  RWT: 0.23  Ejection Fraction Visual Estimate: 10 %    ------------------------------------------------------------------------  OBSERVATIONS:  Mitral Valve: Normal mitral valve. Mild mitral  regurgitation.  Aortic Root: Aortic Root: 3.1 cm.    Aortic Valve: Normal trileaflet aortic valve. Mild aortic  insufficiency.  Left Atrium: Severe left atrial enlargement.  Left Ventricle: Severe global left ventricular systolic  dysfunction. Normal left ventricular internal dimensions  and wall thicknesses. Grade II diastolic dysfunction.  Right Heart: Moderate right atrial enlargement. Right  ventricular enlargement with decreased RV systolic function  (TAPSE 1.3cm). There is moderate tricuspid regurgitation.  There is mild pulmonic regurgitation.  Pericardium/PleuraNormal pericardium with no pericardial  effusion.  Hemodynamic: RV systolic pressure is moderately increased  at  51 mm Hg.  ------------------------------------------------------------------------  CONCLUSIONS:  1. Normal mitral valve. Mild mitral regurgitation.  2. Normal trileaflet aortic valve. Mild aortic  insufficiency.  3. Aortic Root: 3.1 cm.  4. Severe left atrial enlargement.  5. Severe global left ventricular systolic dysfunction.  6. Grade II diastolic dysfunction.  7. Moderate right atrial enlargement.  8. Right ventricular enlargement with decreased RV systolic  function (TAPSE 1.3cm).  9. RV systolic pressure is moderately increased at  51 mm  Hg.  10. There is moderate tricuspid regurgitation.  11. There is mild pulmonic regurgitation.  12. Normal pericardium with no pericardial effusion.    ------------------------------------------------------------------------  Confirmed on  2018 - 07:28:09 by Ni Chavarria MD  ------------------------------------------------------------------------    < end of copied text >        LABS:                        11.0   9.3   )-----------( 294      ( 2018 15:17 )             36.8         148<H>  |  114<H>  |  44<H>  ----------------------------<  97  3.4<L>   |  22  |  1.58<H>    Ca    8.2<L>      2018 15:17  Mg     2.4         TPro  7.3  /  Alb  2.8<L>  /  TBili  1.6<H>  /  DBili  x   /  AST  31  /  ALT  22  /  AlkPhos  237<H>      PT/INR - ( 2018 15:17 )   PT: 13.8 sec;   INR: 1.24 ratio         PTT - ( 2018 15:17 )  PTT:28.6 sec  Urinalysis Basic - ( 2018 18:03 )    Color: Yellow / Appearance: Clear / S.020 / pH: x  Gluc: x / Ketone: Negative  / Bili: Negative / Urobili: 4   Blood: x / Protein: 15 / Nitrite: Negative   Leuk Esterase: Trace / RBC: 2-5 /HPF / WBC 6-10 /HPF   Sq Epi: x / Non Sq Epi: Few /HPF / Bacteria: Many /HPF      CAPILLARY BLOOD GLUCOSE      POCT Blood Glucose.: 120 mg/dL (2018 14:08)    < from: US Duplex Venous Lower Ext Ltd, Right (18 @ 20:14) >  IMPRESSION:     No evidence of right lower extremity deep venous thrombosis.        < end of copied text >

## 2018-11-21 NOTE — PHYSICAL THERAPY INITIAL EVALUATION ADULT - PERTINENT HX OF CURRENT PROBLEM, REHAB EVAL
Pt. admitted from home due to Pt. admitted from home due to a fall; Radiographs (-) for any injuries/fracture; Pt. w/ h/o  Dementia, severe LV global dysfunction.  Pt. lives w/ dtr. and uses RW to ambulate with.

## 2018-11-21 NOTE — PROGRESS NOTE ADULT - PROBLEM SELECTOR PLAN 5
-Has a Positive UA  -could be a cause of worsening mental status  -will treat empirically with rocephin until culture returns

## 2018-11-21 NOTE — PHYSICAL THERAPY INITIAL EVALUATION ADULT - 30 SECOND CHAIR STAND TEST, REHAB EVAL
According to 30 second chair stand test, patient's score is at 2  which reveals significant functional weakness and puts the patient at risk for falls. Based on the score mentioned above, patient is unsafe to be discharged to home and will greatly benefit from sub-acute rehab placement.

## 2018-11-21 NOTE — CONSULT NOTE ADULT - PROBLEM SELECTOR RECOMMENDATION 9
FAST score 6e, no behavioral issues at present. Dependent on most ADLs. Tolerating dysphagia diet, appetite improving, ate approx 90% meal

## 2018-11-21 NOTE — CONSULT NOTE ADULT - PROBLEM SELECTOR RECOMMENDATION 4
ceftriaxone
Met with patient's daughter at bedside regarding current clinical condition, trajectory of illness, goals of care. She expressed that she understands her mother's condition is declining and prefers for comfort measures, remains DNR/DNI. Advised that the patient is appropriate for home w hospice given end stage CHF. She expressed that she has been caring for her mother for the past 9 months however she cannot continue to do it, requiring too much care and cannot afford to pvt hire an aide at this time. The patient does not have insurance coverage for LTC. She prefers to do RUBEN for now and try to make alternate financial arrangements in the meantime. SW referral made. BI drafted for DNR/DNI, comfort measures only, no feeding tubes, no central lines or pressors. Rehospitalize if necessary for now.

## 2018-11-21 NOTE — PHYSICAL THERAPY INITIAL EVALUATION ADULT - GENERAL OBSERVATIONS, REHAB EVAL
Pt. received in supine; AxOX2; IV line and monitor in place; Hematoma on multiple areas of lower extremities noted; (R) knee wrapped in ace bandage.

## 2018-11-21 NOTE — DIETITIAN INITIAL EVALUATION ADULT. - OTHER INFO
Nutrition consult requested for significant decrease of oral intake > 5d prior to admit; lives home PTA: skin intact; s/p swallow evaluation with mechanical soft food, thin liquid recommended 11/20/18; intake improving, from 50 to 60% in flow sheet to 90% today per nursing report Nutrition consult requested for significant decrease of oral intake > 5d prior to admit; lives home PTA: skin intact; s/p swallow evaluation with mechanical soft food, thin liquid recommended 11/20/18; intake improving, from 50 to 60% in flow sheet to 90% today per nursing report.

## 2018-11-21 NOTE — PROGRESS NOTE ADULT - PROBLEM SELECTOR PLAN 4
-chronic right leg swelling, but new bruising s/p fall   -cannot move extremity 2/2 to pain   -X ray shows large supra patellar effusion.   -refused CT scan of left lower leg to rule out any fracture or hematoma   -dopplers of lower extremity normal  -s/p tap and steroid injection  -consulted orthopedics

## 2018-11-21 NOTE — DIETITIAN INITIAL EVALUATION ADULT. - FACTORS AFF FOOD INTAKE
change in mental status/difficulty with food procurement/preparation/difficulty chewing/difficulty feeding self

## 2018-11-21 NOTE — CONSULT NOTE ADULT - SUBJECTIVE AND OBJECTIVE BOX
HPI:  Patient is a 86 year old female from home lives with daughter, walks with a walker with PMH of Severe Dementia, Afib (not on AC by choice), CHF echo in 2017 showed severe LV global dysfunction ,Intubations x 4 in past for CHF, CAD s/p 5 stents , HTN, osteoarthritis was brought to the ED by daughter for inability to walk and poor PO in take x 1 week. Patient is a very poor historian and history was obtained by daughter Guzman at bed side. Patient's daughter reported that 1 week ago, Patient was able to walk with her walker at Home and is more alert, but now she has stopped walking and stays in her bed most of the time. She also has urinary incontinence. Patient slipped from her bed 2 weeks ago and landed on her side. it was an unwitnessed fall. Daughter reports that patient is been drinking a lot of water, but her PO in take is almost none. Patient's daughter hasn't noted any shortness of breath, fever, chest pain. Patient has chronic right leg swelling, but now she has new bruises on her right leg, for which she held her aspirin after speaking with her PMD.     In ED Patient; s vitals are stable and she is in no acute distress. Patient is AAO x 1 to self, she is unable to follow commands and resident was unable to perform complete neurological exam. However strength is 5/5 in bilateral right upper extremities, she can lift her left lower extremity against gravity, but unable to move right lower extremity. CXR shows fluid overload and [place] BNP is 09077, Troponin 0.094. UA is positive. EKG is NSR with LBBB HR 63 b/min.   GOC: Patient is DNR/DNI. Daughter is surrogate and doesn't want her Mother to suffer. (2018 18:30)    Interval history: 86 y F hx dementia, end stage CHF, CAD s/p stents admitted for lethargy, decreased oral intake, found to have UTI, CHF exacerbation. Echo shows EF 10%. Patient denies pain or SOB currently. Reports lunch was good. Daughter at bedside.       PAST MEDICAL & SURGICAL HISTORY:  Dementia  Paroxysmal atrial fibrillation  CKD (chronic kidney disease), stage 3 (moderate)  MI (myocardial infarction)  Paroxysmal atrial fibrillation  Osteoarthritis  HTN (hypertension)  CHF, chronic: EF 40-45%  CAD (coronary artery disease): S/p stent x 4 (remote)  CAD (coronary artery disease): s/p 4 stents  Osteoarthritis  CHF (congestive heart failure): hfref  HTN (hypertension)  Stented coronary artery  No significant past surgical history      SOCIAL HISTORY:  has 1 daughter  Admitted from:  home         Surrogate/HCP/Guardian:  Roxanna Browning          Phone#: [phone]    FAMILY HISTORY:  No pertinent family history in first degree relatives  No pertinent family history in first degree relatives    Baseline ADLs (prior to admission): ambulatory w walker, verbal, dependent on most ADLs    Allergies    azithromycin (Other)  Zithromax (Other)    Intolerances      Present Symptoms:   Dyspnea: denies  Nausea/Vomiting: denies  Anxiety: denies  Depressed: denies  Fatigue: +  Loss of appetite: denies  Pain:      denies         Review of Systems: All others negative n]    MEDICATIONS  (STANDING):  acetaminophen  IVPB .. 1000 milliGRAM(s) IV Intermittent once  amiodarone    Tablet 200 milliGRAM(s) Oral daily  aspirin  chewable 81 milliGRAM(s) Oral daily  cefTRIAXone   IVPB 1 Gram(s) IV Intermittent every 24 hours  enoxaparin Injectable 30 milliGRAM(s) SubCutaneous daily  ergocalciferol 81311 Unit(s) Oral <User Schedule>  furosemide   Injectable 40 milliGRAM(s) IV Push every 12 hours  influenza   Vaccine 0.5 milliLiter(s) IntraMuscular once  insulin lispro (HumaLOG) corrective regimen sliding scale   SubCutaneous three times a day before meals  levothyroxine 25 MICROGram(s) Oral daily  methylPREDNISolone acetate Injectable 40 milliGRAM(s) IntraArticular once  metoprolol tartrate 12.5 milliGRAM(s) Oral two times a day  PARoxetine 10 milliGRAM(s) Oral daily    MEDICATIONS  (PRN):  acetaminophen   Tablet .. 650 milliGRAM(s) Oral every 6 hours PRN Temp greater or equal to 38C (100.4F), Moderate Pain (4 - 6)      PHYSICAL EXAM:    Vital Signs Last 24 Hrs  T(C): 36.8 (2018 17:25), Max: 37 (2018 23:40)  T(F): 98.2 (2018 17:25), Max: 98.6 (2018 23:40)  HR: 50 (2018 17:25) (48 - 83)  BP: 97/44 (2018 17:25) (90/34 - 119/64)  BP(mean): --  RR: 18 (2018 17:25) (18 - 18)  SpO2: 99% (2018 17:25) (96% - 100%)       Karnofsky Performance Score/Palliative Performance Status Version2: 50 %     GENERAL: alert, sitting in chair,  NAD  HEENT: Atraumatic, oropharynx clear, neck supple  CHEST/LUNG: unlabored  HEART: Regular rate and rhythm    ABDOMEN: Soft, Nontender, Nondistended   MUSCULOSKELETAL:  + edema   NERVOUS SYSTEM:  Alert & Oriented X1,  follows commands  SKIN: No rashes or lesions noted  Oral intake: fair     LABS:                        10.0   9.2   )-----------( 277      ( 2018 15:03 )             33.8     11-    146<H>  |  113<H>  |  49<H>  ----------------------------<  122<H>  3.8   |  25  |  1.55<H>    Ca    8.0<L>      2018 07:11  Phos  3.9     -  Mg     2.5         TPro  6.8  /  Alb  2.4<L>  /  TBili  1.3<H>  /  DBili  x   /  AST  26  /  ALT  20  /  AlkPhos  199<H>  11-20    Urinalysis Basic - ( 2018 18:03 )    Color: Yellow / Appearance: Clear / S.020 / pH: x  Gluc: x / Ketone: Negative  / Bili: Negative / Urobili: 4   Blood: x / Protein: 15 / Nitrite: Negative   Leuk Esterase: Trace / RBC: 2-5 /HPF / WBC 6-10 /HPF   Sq Epi: x / Non Sq Epi: Few /HPF / Bacteria: Many /HPF        RADIOLOGY & ADDITIONAL STUDIES:    ADVANCE DIRECTIVES:

## 2018-11-21 NOTE — PROGRESS NOTE ADULT - SUBJECTIVE AND OBJECTIVE BOX
PGY 1 Note discussed with supervising resident and primary attending    Patient is a 86y old  Female who presents with a chief complaint of FTT , CHF (2018 15:06)      INTERVAL HPI/OVERNIGHT EVENTS: offers no complaints, no acute overnight events    MEDICATIONS  (STANDING):  acetaminophen  IVPB .. 1000 milliGRAM(s) IV Intermittent once  amiodarone    Tablet 200 milliGRAM(s) Oral daily  aspirin  chewable 81 milliGRAM(s) Oral daily  cefTRIAXone   IVPB 1 Gram(s) IV Intermittent every 24 hours  enoxaparin Injectable 30 milliGRAM(s) SubCutaneous daily  ergocalciferol 65497 Unit(s) Oral <User Schedule>  furosemide   Injectable 40 milliGRAM(s) IV Push every 12 hours  influenza   Vaccine 0.5 milliLiter(s) IntraMuscular once  levothyroxine 25 MICROGram(s) Oral daily  methylPREDNISolone acetate Injectable 40 milliGRAM(s) IntraArticular once  metoprolol tartrate 12.5 milliGRAM(s) Oral two times a day  PARoxetine 10 milliGRAM(s) Oral daily    MEDICATIONS  (PRN):  acetaminophen   Tablet .. 650 milliGRAM(s) Oral every 6 hours PRN Temp greater or equal to 38C (100.4F), Moderate Pain (4 - 6)      __________________________________________________      Vital Signs Last 24 Hrs  T(C): 36.8 (2018 11:24), Max: 37 (2018 23:40)  T(F): 98.3 (2018 11:24), Max: 98.6 (2018 23:40)  HR: 56 (2018 11:24) (48 - 83)  BP: 119/64 (2018 11:24) (95/42 - 119/64)  BP(mean): --  RR: 18 (2018 11:24) (17 - 18)  SpO2: 100% (2018 11:24) (96% - 100%)    ________________________________________________  PHYSICAL EXAM:  GENERAL: NAD  HEENT: Normocephalic;  conjunctivae and sclerae clear; moist mucous membranes;   NECK : supple  CHEST/LUNG: Clear to auscultation bilaterally with good air entry   HEART: S1 S2  regular; no murmurs, gallops or rubs  ABDOMEN: Soft, Nontender, Nondistended; Bowel sounds present  EXTREMITIES: Rt. leg bandaged, no cyanosis; no edema; no calf tenderness  SKIN: warm and dry; no rash  NERVOUS SYSTEM:  Awake and alert; no new deficits    _________________________________________________  LABS:                        10.0   9.2   )-----------( 277      ( 2018 15:03 )             33.8     11-    146<H>  |  113<H>  |  49<H>  ----------------------------<  122<H>  3.8   |  25  |  1.55<H>    Ca    8.0<L>      2018 07:11  Phos  3.9     -  Mg     2.5         TPro  6.8  /  Alb  2.4<L>  /  TBili  1.3<H>  /  DBili  x   /  AST  26  /  ALT  20  /  AlkPhos  199<H>  11-20      Urinalysis Basic - ( 2018 18:03 )    Color: Yellow / Appearance: Clear / S.020 / pH: x  Gluc: x / Ketone: Negative  / Bili: Negative / Urobili: 4   Blood: x / Protein: 15 / Nitrite: Negative   Leuk Esterase: Trace / RBC: 2-5 /HPF / WBC 6-10 /HPF   Sq Epi: x / Non Sq Epi: Few /HPF / Bacteria: Many /HPF      CAPILLARY BLOOD GLUCOSE            RADIOLOGY & ADDITIONAL TESTS:    < from: Xray Chest 1 View- PORTABLE-Routine (18 @ 09:55) >    INTERPRETATION:  AP semierect chest on 2018 at 9:05 AM.   Prior study of  showed vascular congestion and heart   enlargement.    On present film heart enlargement again noted.    Central congestion off the right hilum again noted possibly slightly   improved from .    Heart obscures most of the medial and lower left lung on both studies.    IMPRESSION: Possible slight improvement in right-sided congestive   findings. Left lung largely obscured.          Imaging Personally Reviewed:  YES    Consultant(s) Notes Reviewed:   YES    Care Discussed with Consultants : yes    Plan of care was discussed with patient and /or primary care giver; all questions and concerns were addressed and care was aligned with patient's wishes.

## 2018-11-21 NOTE — PROGRESS NOTE ADULT - PROBLEM SELECTOR PLAN 8
-Patient's troponins are elevated to 0.09  -Patient has CHF, denies any chest Pain  -EKG is NSR, with LBBB. Last cardiac cath in Nov 2016 s/p 5th drug eluting stent placement for stenosis of Circumflex artery ,100% stenosis of LAD.  -likely demand ischemia from CHF  -will c/w aspirin, metoprolol and atorvastatin  -will hold Plavix as it has been > 1 year since her last stent.   - troponin trended down -c/w aspirin, statin and BB

## 2018-11-21 NOTE — DIETITIAN INITIAL EVALUATION ADULT. - MD RECOMMEND
change diet to DASH, Dysphagia Mechanical soft, thin liquid, with Ensure Enlive 1can daily as medically feasible (350kcal, 20g protein )

## 2018-11-21 NOTE — CONSULT NOTE ADULT - PROBLEM SELECTOR RECOMMENDATION 3
eden negative  ortho for rt knee swelling  pt ot
end stage CHF, management per Dr Carrillo. Diuresis as tolerated, monitor lytes, Cr. Echo showed EF 10%. Functional decline.  Hospice eligible.
diuresis  f/u cardio recs

## 2018-11-21 NOTE — CONSULT NOTE ADULT - PROBLEM SELECTOR RECOMMENDATION 2
iv lasix  echo   cardilogy
2/2 end stage CHF,dementia. Dependent on most ADLs. PT/OOB to chair as tolerated. PT josiane noted, ambulated 10 ft w walker, recommended for RUBEN.
cont rocephin  per prim team

## 2018-11-21 NOTE — DIETITIAN INITIAL EVALUATION ADULT. - PERTINENT MEDS FT
reviewed  MEDICATIONS  (STANDING):  acetaminophen  IVPB .. 1000 milliGRAM(s) IV Intermittent once  amiodarone    Tablet 200 milliGRAM(s) Oral daily  aspirin  chewable 81 milliGRAM(s) Oral daily  cefTRIAXone   IVPB 1 Gram(s) IV Intermittent every 24 hours  enoxaparin Injectable 30 milliGRAM(s) SubCutaneous daily  ergocalciferol 33272 Unit(s) Oral <User Schedule>  furosemide   Injectable 40 milliGRAM(s) IV Push every 12 hours  influenza   Vaccine 0.5 milliLiter(s) IntraMuscular once  levothyroxine 25 MICROGram(s) Oral daily  methylPREDNISolone acetate Injectable 40 milliGRAM(s) IntraArticular once  metoprolol tartrate 12.5 milliGRAM(s) Oral two times a day  PARoxetine 10 milliGRAM(s) Oral daily    MEDICATIONS  (PRN):  acetaminophen   Tablet .. 650 milliGRAM(s) Oral every 6 hours PRN Temp greater or equal to 38C (100.4F), Moderate Pain (4 - 6)

## 2018-11-21 NOTE — DIETITIAN INITIAL EVALUATION ADULT. - NUTRITION INTERVENTION
Medical Food Supplements/Feeding Assistance/Meals and Snack/Collaboration and Referral of Nutrition Care

## 2018-11-21 NOTE — PROGRESS NOTE ADULT - SUBJECTIVE AND OBJECTIVE BOX
Interval Events:      Allergies    azithromycin (Other)  Zithromax (Other)    Intolerances      Endocrine/Metabolic Medications:  levothyroxine 25 MICROGram(s) Oral daily  methylPREDNISolone acetate Injectable 40 milliGRAM(s) IntraArticular once      Vital Signs Last 24 Hrs  T(C): 36.8 (21 Nov 2018 11:24), Max: 37 (20 Nov 2018 23:40)  T(F): 98.3 (21 Nov 2018 11:24), Max: 98.6 (20 Nov 2018 23:40)  HR: 56 (21 Nov 2018 11:24) (48 - 83)  BP: 119/64 (21 Nov 2018 11:24) (95/42 - 119/64)  BP(mean): --  RR: 18 (21 Nov 2018 11:24) (17 - 18)  SpO2: 100% (21 Nov 2018 11:24) (96% - 100%)      PHYSICAL EXAM  All physical exam findings normal, except those marked:  General:	Alert, active, cooperative, NAD, well hydrated  .		[] Abnormal:  Neck		Normal: supple, no cervical adenopathy, no palpable thyroid  .		[] Abnormal:  Cardiovascular	Normal: regular rate, normal S1, S2, no murmurs  .		[] Abnormal:  Respiratory	Normal: no chest wall deformity, normal respiratory pattern, CTA B/L  .		[] Abnormal:  Abdominal	Normal: soft, ND, NT, bowel sounds present, no masses, no organomegaly  .		[] Abnormal:  		Normal normal genitalia, testes descended, circumcised/uncircumcised  .		Madeline stage:			Breast madeline:  .		Menstrual history:  .		[] Abnormal:  Extremities	Normal: FROM x4  .		[] Abnormal:  Skin		Normal: intact and not indurated, no rash, no acanthosis nigricans  .		[] Abnormal:  Neurologic	Normal: grossly intact  .		[] Abnormal:    LABS                        9.4    6.9   )-----------( 214      ( 21 Nov 2018 07:11 )             32.1                               146    |  113    |  49                  Calcium: 8.0   / iCa: x      (11-21 @ 07:11)    ----------------------------<  122       Magnesium: 2.5                              3.8     |  25     |  1.55             Phosphorous: 3.9        CAPILLARY BLOOD GLUCOSE            Assesment/plan Interval Events:  pt in nad    Allergies    azithromycin (Other)  Zithromax (Other)    Intolerances      Endocrine/Metabolic Medications:  levothyroxine 25 MICROGram(s) Oral daily  methylPREDNISolone acetate Injectable 40 milliGRAM(s) IntraArticular once      Vital Signs Last 24 Hrs  T(C): 36.8 (21 Nov 2018 11:24), Max: 37 (20 Nov 2018 23:40)  T(F): 98.3 (21 Nov 2018 11:24), Max: 98.6 (20 Nov 2018 23:40)  HR: 56 (21 Nov 2018 11:24) (48 - 83)  BP: 119/64 (21 Nov 2018 11:24) (95/42 - 119/64)  BP(mean): --  RR: 18 (21 Nov 2018 11:24) (17 - 18)  SpO2: 100% (21 Nov 2018 11:24) (96% - 100%)      PHYSICAL EXAM  All physical exam findings normal, except those marked:  General:	Alert, active, cooperative, NAD, well hydrated  .		[] Abnormal:  Neck		Normal: supple, no cervical adenopathy, no palpable thyroid  .		[] Abnormal:  Cardiovascular	Normal: regular rate, normal S1, S2, no murmurs  .		[] Abnormal:  Respiratory	Normal: no chest wall deformity, normal respiratory pattern, CTA B/L  .		[] Abnormal:  Abdominal	Normal: soft, ND, NT, bowel sounds present, no masses, no organomegaly  .		[] Abnormal:  		Normal normal genitalia, testes descended, circumcised/uncircumcised  .		Madeline stage:			Breast madeline:  .		Menstrual history:  .		[] Abnormal:  Extremities	Normal: FROM x4  .		[] Abnormal:  Skin		Normal: intact and not indurated, no rash, no acanthosis nigricans  .		[] Abnormal:  Neurologic	Normal: grossly intact  .		[] Abnormal:    LABS                        9.4    6.9   )-----------( 214      ( 21 Nov 2018 07:11 )             32.1                               146    |  113    |  49                  Calcium: 8.0   / iCa: x      (11-21 @ 07:11)    ----------------------------<  122       Magnesium: 2.5                              3.8     |  25     |  1.55             Phosphorous: 3.9        CAPILLARY BLOOD GLUCOSE            Assesment/plan    hypothyroidism- on amiodorone  start low dose lt4 25 mcg  titrate slow  tfts in 6 wks

## 2018-11-21 NOTE — PROGRESS NOTE ADULT - PROBLEM SELECTOR PLAN 7
-Patient is on metoprolol 12.5 BID, amiodarone 200mg OD  -on no AC, Only on Plavix and aspirin, Aspirin was held by daughter 10 days ago after she fell  -will resume Aspirin, will hold Plavix given bruises, Pain and inability to move right lower extremity, until rule out hematoma

## 2018-11-22 ENCOUNTER — TRANSCRIPTION ENCOUNTER (OUTPATIENT)
Age: 83
End: 2018-11-22

## 2018-11-22 LAB
ANION GAP SERPL CALC-SCNC: 11 MMOL/L — SIGNIFICANT CHANGE UP (ref 5–17)
BASOPHILS # BLD AUTO: 0 K/UL — SIGNIFICANT CHANGE UP (ref 0–0.2)
BASOPHILS NFR BLD AUTO: 0.3 % — SIGNIFICANT CHANGE UP (ref 0–2)
BUN SERPL-MCNC: 64 MG/DL — HIGH (ref 7–18)
CALCIUM SERPL-MCNC: 7.8 MG/DL — LOW (ref 8.4–10.5)
CHLORIDE SERPL-SCNC: 109 MMOL/L — HIGH (ref 96–108)
CO2 SERPL-SCNC: 23 MMOL/L — SIGNIFICANT CHANGE UP (ref 22–31)
CREAT SERPL-MCNC: 1.89 MG/DL — HIGH (ref 0.5–1.3)
EOSINOPHIL # BLD AUTO: 0 K/UL — SIGNIFICANT CHANGE UP (ref 0–0.5)
EOSINOPHIL NFR BLD AUTO: 0 % — SIGNIFICANT CHANGE UP (ref 0–6)
GLUCOSE BLDC GLUCOMTR-MCNC: 122 MG/DL — HIGH (ref 70–99)
GLUCOSE BLDC GLUCOMTR-MCNC: 133 MG/DL — HIGH (ref 70–99)
GLUCOSE BLDC GLUCOMTR-MCNC: 137 MG/DL — HIGH (ref 70–99)
GLUCOSE BLDC GLUCOMTR-MCNC: 228 MG/DL — HIGH (ref 70–99)
GLUCOSE SERPL-MCNC: 135 MG/DL — HIGH (ref 70–99)
HCT VFR BLD CALC: 31.8 % — LOW (ref 34.5–45)
HCT VFR BLD CALC: 34.1 % — LOW (ref 34.5–45)
HGB BLD-MCNC: 10.3 G/DL — LOW (ref 11.5–15.5)
HGB BLD-MCNC: 9.5 G/DL — LOW (ref 11.5–15.5)
LYMPHOCYTES # BLD AUTO: 0.4 K/UL — LOW (ref 1–3.3)
LYMPHOCYTES # BLD AUTO: 3.8 % — LOW (ref 13–44)
MAGNESIUM SERPL-MCNC: 2.5 MG/DL — SIGNIFICANT CHANGE UP (ref 1.6–2.6)
MCHC RBC-ENTMCNC: 26.1 PG — LOW (ref 27–34)
MCHC RBC-ENTMCNC: 26.3 PG — LOW (ref 27–34)
MCHC RBC-ENTMCNC: 30 GM/DL — LOW (ref 32–36)
MCHC RBC-ENTMCNC: 30.2 GM/DL — LOW (ref 32–36)
MCV RBC AUTO: 86.2 FL — SIGNIFICANT CHANGE UP (ref 80–100)
MCV RBC AUTO: 87.7 FL — SIGNIFICANT CHANGE UP (ref 80–100)
MONOCYTES # BLD AUTO: 0.5 K/UL — SIGNIFICANT CHANGE UP (ref 0–0.9)
MONOCYTES NFR BLD AUTO: 5.5 % — SIGNIFICANT CHANGE UP (ref 2–14)
NEUTROPHILS # BLD AUTO: 8.9 K/UL — HIGH (ref 1.8–7.4)
NEUTROPHILS NFR BLD AUTO: 90.5 % — HIGH (ref 43–77)
PHOSPHATE SERPL-MCNC: 4 MG/DL — SIGNIFICANT CHANGE UP (ref 2.5–4.5)
PLATELET # BLD AUTO: 235 K/UL — SIGNIFICANT CHANGE UP (ref 150–400)
PLATELET # BLD AUTO: 279 K/UL — SIGNIFICANT CHANGE UP (ref 150–400)
POTASSIUM SERPL-MCNC: 3.6 MMOL/L — SIGNIFICANT CHANGE UP (ref 3.5–5.3)
POTASSIUM SERPL-SCNC: 3.6 MMOL/L — SIGNIFICANT CHANGE UP (ref 3.5–5.3)
RBC # BLD: 3.62 M/UL — LOW (ref 3.8–5.2)
RBC # BLD: 3.95 M/UL — SIGNIFICANT CHANGE UP (ref 3.8–5.2)
RBC # FLD: 17.3 % — HIGH (ref 10.3–14.5)
RBC # FLD: 17.4 % — HIGH (ref 10.3–14.5)
SODIUM SERPL-SCNC: 143 MMOL/L — SIGNIFICANT CHANGE UP (ref 135–145)
WBC # BLD: 11.7 K/UL — HIGH (ref 3.8–10.5)
WBC # BLD: 9.9 K/UL — SIGNIFICANT CHANGE UP (ref 3.8–10.5)
WBC # FLD AUTO: 11.7 K/UL — HIGH (ref 3.8–10.5)
WBC # FLD AUTO: 9.9 K/UL — SIGNIFICANT CHANGE UP (ref 3.8–10.5)

## 2018-11-22 RX ADMIN — Medication 81 MILLIGRAM(S): at 12:08

## 2018-11-22 RX ADMIN — Medication 40 MILLIGRAM(S): at 17:37

## 2018-11-22 RX ADMIN — AMIODARONE HYDROCHLORIDE 200 MILLIGRAM(S): 400 TABLET ORAL at 05:37

## 2018-11-22 RX ADMIN — Medication 12.5 MILLIGRAM(S): at 17:37

## 2018-11-22 RX ADMIN — Medication 40 MILLIGRAM(S): at 05:37

## 2018-11-22 RX ADMIN — ENOXAPARIN SODIUM 30 MILLIGRAM(S): 100 INJECTION SUBCUTANEOUS at 12:08

## 2018-11-22 RX ADMIN — CEFTRIAXONE 100 GRAM(S): 500 INJECTION, POWDER, FOR SOLUTION INTRAMUSCULAR; INTRAVENOUS at 22:59

## 2018-11-22 RX ADMIN — Medication 10 MILLIGRAM(S): at 12:08

## 2018-11-22 RX ADMIN — Medication 25 MICROGRAM(S): at 05:38

## 2018-11-22 RX ADMIN — Medication 12.5 MILLIGRAM(S): at 05:37

## 2018-11-22 RX ADMIN — Medication 2: at 17:38

## 2018-11-22 NOTE — PROGRESS NOTE ADULT - SUBJECTIVE AND OBJECTIVE BOX
PGY 1 Note discussed with supervising resident and primary attending    Patient is a 86y old  Female who presents with a chief complaint of FTT , CHF (22 Nov 2018 11:10)      INTERVAL HPI/OVERNIGHT EVENTS: No acute overnight events    MEDICATIONS  (STANDING):  acetaminophen  IVPB .. 1000 milliGRAM(s) IV Intermittent once  amiodarone    Tablet 200 milliGRAM(s) Oral daily  aspirin  chewable 81 milliGRAM(s) Oral daily  cefTRIAXone   IVPB 1 Gram(s) IV Intermittent every 24 hours  enoxaparin Injectable 30 milliGRAM(s) SubCutaneous daily  ergocalciferol 48750 Unit(s) Oral <User Schedule>  furosemide   Injectable 40 milliGRAM(s) IV Push every 12 hours  influenza   Vaccine 0.5 milliLiter(s) IntraMuscular once  insulin lispro (HumaLOG) corrective regimen sliding scale   SubCutaneous three times a day before meals  levothyroxine 25 MICROGram(s) Oral daily  methylPREDNISolone acetate Injectable 40 milliGRAM(s) IntraArticular once  metoprolol tartrate 12.5 milliGRAM(s) Oral two times a day  PARoxetine 10 milliGRAM(s) Oral daily    MEDICATIONS  (PRN):  acetaminophen   Tablet .. 650 milliGRAM(s) Oral every 6 hours PRN Temp greater or equal to 38C (100.4F), Moderate Pain (4 - 6)      __________________________________________________  REVIEW OF SYSTEMS:    CONSTITUTIONAL: No fever,   EYES: no acute visual disturbances  NECK: No pain or stiffness  RESPIRATORY: No cough; No shortness of breath  CARDIOVASCULAR: No chest pain, no palpitations  GASTROINTESTINAL: No pain. No nausea or vomiting; No diarrhea   NEUROLOGICAL: No headache or numbness, no tremors  MUSCULOSKELETAL: No joint pain, no muscle pain  GENITOURINARY: no dysuria, no frequency, no hesitancy  PSYCHIATRY: no depression , no anxiety  ALL OTHER  ROS negative        Vital Signs Last 24 Hrs  T(C): 36.3 (22 Nov 2018 11:22), Max: 36.9 (21 Nov 2018 23:00)  T(F): 97.3 (22 Nov 2018 11:22), Max: 98.4 (21 Nov 2018 23:00)  HR: 50 (22 Nov 2018 11:22) (50 - 84)  BP: 106/46 (22 Nov 2018 11:22) (90/34 - 121/65)  BP(mean): --  RR: 18 (22 Nov 2018 11:22) (18 - 20)  SpO2: 100% (22 Nov 2018 11:22) (93% - 100%)    ________________________________________________  PHYSICAL EXAM:  GENERAL: NAD  HEENT: Normocephalic;  conjunctivae and sclerae clear; moist mucous membranes;   NECK : supple  CHEST/LUNG: Clear to auscultation bilaterally with good air entry   HEART: S1 S2  regular; no murmurs, gallops or rubs  ABDOMEN: Soft, Nontender, Nondistended; Bowel sounds present  EXTREMITIES: no cyanosis; no edema; no calf tenderness  SKIN: warm and dry; no rash  NERVOUS SYSTEM:  Awake and alert; Oriented  to place, person and time ; no new deficits    _________________________________________________  LABS:                        9.5    9.9   )-----------( 235      ( 22 Nov 2018 06:55 )             31.8     11-22    143  |  109<H>  |  64<H>  ----------------------------<  135<H>  3.6   |  23  |  1.89<H>    Ca    7.8<L>      22 Nov 2018 06:55  Phos  4.0     11-22  Mg     2.5     11-22          CAPILLARY BLOOD GLUCOSE      POCT Blood Glucose.: 122 mg/dL (22 Nov 2018 11:37)  POCT Blood Glucose.: 137 mg/dL (22 Nov 2018 08:18)  POCT Blood Glucose.: 155 mg/dL (21 Nov 2018 21:15)        RADIOLOGY & ADDITIONAL TESTS:    < from: Xray Chest 1 View- PORTABLE-Routine (11.21.18 @ 09:55) >    INTERPRETATION:  AP semierect chest on November 21, 2018 at 9:05 AM.   Prior study of November 19 showed vascular congestion and heart   enlargement.    On present film heart enlargement again noted.    Central congestion off the right hilum again noted possibly slightly   improved from November 19.    Heart obscures most of the medial and lower left lung on both studies.    IMPRESSION: Possible slight improvement in right-sided congestive   findings. Left lung largely obscured.      Imaging Personally Reviewed:  YES/NO    Consultant(s) Notes Reviewed:   YES/ No    Care Discussed with Consultants :     Plan of care was discussed with patient and /or primary care giver; all questions and concerns were addressed and care was aligned with patient's wishes. PGY 1 Note discussed with supervising resident and primary attending    Patient is a 86y old  Female who presents with a chief complaint of FTT , CHF (22 Nov 2018 11:10)      INTERVAL HPI/OVERNIGHT EVENTS: No acute overnight events    MEDICATIONS  (STANDING):  acetaminophen  IVPB .. 1000 milliGRAM(s) IV Intermittent once  amiodarone    Tablet 200 milliGRAM(s) Oral daily  aspirin  chewable 81 milliGRAM(s) Oral daily  cefTRIAXone   IVPB 1 Gram(s) IV Intermittent every 24 hours  enoxaparin Injectable 30 milliGRAM(s) SubCutaneous daily  ergocalciferol 62277 Unit(s) Oral <User Schedule>  furosemide   Injectable 40 milliGRAM(s) IV Push every 12 hours  influenza   Vaccine 0.5 milliLiter(s) IntraMuscular once  insulin lispro (HumaLOG) corrective regimen sliding scale   SubCutaneous three times a day before meals  levothyroxine 25 MICROGram(s) Oral daily  methylPREDNISolone acetate Injectable 40 milliGRAM(s) IntraArticular once  metoprolol tartrate 12.5 milliGRAM(s) Oral two times a day  PARoxetine 10 milliGRAM(s) Oral daily    MEDICATIONS  (PRN):  acetaminophen   Tablet .. 650 milliGRAM(s) Oral every 6 hours PRN Temp greater or equal to 38C (100.4F), Moderate Pain (4 - 6)      __________________________________________________  REVIEW OF SYSTEMS:    CONSTITUTIONAL: No fever,   EYES: no acute visual disturbances  NECK: No pain or stiffness  RESPIRATORY: No cough; No shortness of breath  CARDIOVASCULAR: No chest pain, no palpitations  GASTROINTESTINAL: No pain. No nausea or vomiting; No diarrhea   NEUROLOGICAL: No headache or numbness, no tremors  MUSCULOSKELETAL: No joint pain, no muscle pain  GENITOURINARY: no dysuria, no frequency, no hesitancy  PSYCHIATRY: no depression , no anxiety  ALL OTHER  ROS negative        Vital Signs Last 24 Hrs  T(C): 36.3 (22 Nov 2018 11:22), Max: 36.9 (21 Nov 2018 23:00)  T(F): 97.3 (22 Nov 2018 11:22), Max: 98.4 (21 Nov 2018 23:00)  HR: 50 (22 Nov 2018 11:22) (50 - 84)  BP: 106/46 (22 Nov 2018 11:22) (90/34 - 121/65)  BP(mean): --  RR: 18 (22 Nov 2018 11:22) (18 - 20)  SpO2: 100% (22 Nov 2018 11:22) (93% - 100%)    ________________________________________________  PHYSICAL EXAM:  GENERAL: NAD  HEENT: Normocephalic;  conjunctivae and sclerae clear; moist mucous membranes;   NECK : supple  CHEST/LUNG: Clear to auscultation bilaterally with good air entry   HEART: S1 S2  regular; no murmurs, gallops or rubs  ABDOMEN: Soft, Nontender, Nondistended; Bowel sounds present  EXTREMITIES: Rt. leg bandaged, no cyanosis; no edema; no calf tenderness  SKIN: warm and dry; no rash  NERVOUS SYSTEM:  Awake and alert; no new deficits    _________________________________________________  LABS:                        9.5    9.9   )-----------( 235      ( 22 Nov 2018 06:55 )             31.8     11-22    143  |  109<H>  |  64<H>  ----------------------------<  135<H>  3.6   |  23  |  1.89<H>    Ca    7.8<L>      22 Nov 2018 06:55  Phos  4.0     11-22  Mg     2.5     11-22          CAPILLARY BLOOD GLUCOSE      POCT Blood Glucose.: 122 mg/dL (22 Nov 2018 11:37)  POCT Blood Glucose.: 137 mg/dL (22 Nov 2018 08:18)  POCT Blood Glucose.: 155 mg/dL (21 Nov 2018 21:15)        RADIOLOGY & ADDITIONAL TESTS:    < from: Xray Chest 1 View- PORTABLE-Routine (11.21.18 @ 09:55) >    INTERPRETATION:  AP semierect chest on November 21, 2018 at 9:05 AM.   Prior study of November 19 showed vascular congestion and heart   enlargement.    On present film heart enlargement again noted.    Central congestion off the right hilum again noted possibly slightly   improved from November 19.    Heart obscures most of the medial and lower left lung on both studies.    IMPRESSION: Possible slight improvement in right-sided congestive   findings. Left lung largely obscured.      Imaging Personally Reviewed:  YES/NO    Consultant(s) Notes Reviewed:   YES/ No    Care Discussed with Consultants :     Plan of care was discussed with patient and /or primary care giver; all questions and concerns were addressed and care was aligned with patient's wishes.

## 2018-11-22 NOTE — DISCHARGE NOTE ADULT - PATIENT PORTAL LINK FT
You can access the [facility] Patient Portal, offered by [facility], by registering with the following website: [email or web address]

## 2018-11-22 NOTE — DISCHARGE NOTE ADULT - MEDICATION SUMMARY - MEDICATIONS TO TAKE
I will START or STAY ON the medications listed below when I get home from the hospital:    aspirin 81 mg oral tablet, chewable  -- 1 tab(s) by mouth once a day  -- Indication: For CAD (coronary artery disease)    acetaminophen 325 mg oral tablet  -- 2 tab(s) by mouth every 6 hours, As needed, Temp greater or equal to 38C (100.4F), Moderate Pain (4 - 6)  -- Indication: For Pain    amiodarone 200 mg oral tablet  -- 1 tab(s) by mouth once a day HOME HOSP  -- Indication: For Atrial fibrillation    PARoxetine 10 mg oral tablet  -- 1 tab(s) by mouth once a day  -- Indication: For Dementia    Lipitor 40 mg oral tablet  -- 1 tab(s) by mouth 2 times a day  -- Indication: For Hyperlipidemia    clopidogrel 75 mg oral tablet  -- 1 tab(s) by mouth once a day HOME HOSP  -- Indication: For CAD (coronary artery disease)    metoprolol tartrate 25 mg oral tablet  -- 0.5 tab(s) by mouth 2 times a day  -- Indication: For Atrial fibrillation    Lasix 40 mg oral tablet  -- 1 tab(s) by mouth 2 times a day  -- Indication: For CHF (congestive heart failure)    levothyroxine 25 mcg (0.025 mg) oral tablet  -- 1 tab(s) by mouth once a day  -- Indication: For Hypothyroidism

## 2018-11-22 NOTE — DISCHARGE NOTE ADULT - PLAN OF CARE
Resolution of symptoms Your urine analysis was positive, urine culture showed E.coli. You were treated with Rocephin. Please follow up with your PCP after 1 week of discharge. Your echocardiogram shows Right ventricular enlargement with decreased RV systolic function (TAPSE 1.3cm) with EF 10%. Because of poor prognosis, palliative was consulted and discussed with your family and agreed on conservative measures for now. Please continue taking your medications. Please follow up with your PCP after 1 week of discharge. Continue with blood pressure medication. Maintain a healthy diet that consist of low sugar, low fat, low sodium diet. Exercise frequently if possible. Follow up with primary care physician in one week after discharge. Your TSH was elevated. It is likely due to side effects from amiodarone. You are started on levothyroxine. Please continue taking your medications. Please follow up with your PCP after 1 week of discharge.

## 2018-11-22 NOTE — DISCHARGE NOTE ADULT - INSTRUCTIONS
Recommend the DASH Diet. This diet emphasizes vegetables, fruits, and fat-free or low-fat dairy products.  Includes whole grains, fish, poultry, beans, seeds, nuts, and vegetable oils. Please limit sodium, sweets, sugary beverages, and red meats.

## 2018-11-22 NOTE — DISCHARGE NOTE ADULT - NS AS DC FOLLOWUP STROKE INST
Influenza vaccination (VIS Pub Date: August 7, 2015) Influenza vaccination (VIS Pub Date: August 7, 2015)/Heart Failure

## 2018-11-22 NOTE — DISCHARGE NOTE ADULT - SECONDARY DIAGNOSIS.
CHF (congestive heart failure) CAD (coronary artery disease) Atrial fibrillation Dementia HTN (hypertension) Hypothyroidism

## 2018-11-22 NOTE — PROGRESS NOTE ADULT - SUBJECTIVE AND OBJECTIVE BOX
Patient is a 86y old  Female who presents with a chief complaint of FTT , CHF (2018 15:06)      INTERVAL HPI/OVERNIGHT EVENTS: offers no specific  complaints, no acute overnight events but very weak  .MEDICATIONS  (STANDING):  acetaminophen  IVPB .. 1000 milliGRAM(s) IV Intermittent once  amiodarone    Tablet 200 milliGRAM(s) Oral daily  aspirin  chewable 81 milliGRAM(s) Oral daily  cefTRIAXone   IVPB 1 Gram(s) IV Intermittent every 24 hours  enoxaparin Injectable 30 milliGRAM(s) SubCutaneous daily  ergocalciferol 39672 Unit(s) Oral <User Schedule>  furosemide   Injectable 40 milliGRAM(s) IV Push every 12 hours  influenza   Vaccine 0.5 milliLiter(s) IntraMuscular once  insulin lispro (HumaLOG) corrective regimen sliding scale   SubCutaneous three times a day before meals  levothyroxine 25 MICROGram(s) Oral daily  methylPREDNISolone acetate Injectable 40 milliGRAM(s) IntraArticular once  metoprolol tartrate 12.5 milliGRAM(s) Oral two times a day  PARoxetine 10 milliGRAM(s) Oral daily    MEDICATIONS  (PRN):  acetaminophen   Tablet .. 650 milliGRAM(s) Oral every 6 hours PRN Temp greater or equal to 38C (100.4F), Moderate Pain (4 - 6)      __________________________________________________    .Vital Signs Last 24 Hrs  T(C): 36.4 (2018 07:52), Max: 36.9 (2018 23:00)  T(F): 97.6 (2018 07:52), Max: 98.4 (2018 23:00)  HR: 51 (2018 07:52) (50 - 84)  BP: 121/65 (2018 07:52) (90/34 - 121/65)  BP(mean): --  RR: 18 (2018 07:52) (18 - 20)  SpO2: 95% (2018 07:52) (93% - 100%)  ________________________________________________  PHYSICAL EXAM:  GENERAL: NAD  HEENT: Normocephalic;  conjunctivae and sclerae clear; moist mucous membranes;   NECK : supple  CHEST/LUNG: Clear to auscultation bilaterally with good air entry   HEART: S1 S2  regular; no murmurs, gallops or rubs  ABDOMEN: Soft, Nontender, Nondistended; Bowel sounds present  EXTREMITIES: Rt. leg bandaged, no cyanosis; no edema; no calf tenderness  SKIN: warm and dry; no rash  NERVOUS SYSTEM:  Awake and alert; no new deficits    _________________________________________________  LABS:                        9.5    9.9   )-----------( 235      ( 2018 06:55 )             31.8         143  |  109<H>  |  64<H>  ----------------------------<  135<H>  3.6   |  23  |  1.89<H>    Ca    7.8<L>      2018 06:55  Phos  4.0       Mg     2.5                       Serum Pro-Brain Natriuretic Peptide: 58737 pg/mL (18 @ 15:17)            LABS:                        10.0   9.2   )-----------( 277      ( 2018 15:03 )             33.8   < from: Transthoracic Echocardiogram (18 @ 07:14) >  LVMI: 102 g/m2  RWT: 0.23  Ejection Fraction Visual Estimate: 10 %    ------------------------------------------------------------------------  OBSERVATIONS:  Mitral Valve: Normal mitral valve. Mild mitral  regurgitation.  Aortic Root: Aortic Root: 3.1 cm.    Aortic Valve: Normal trileaflet aortic valve. Mild aortic  insufficiency.  Left Atrium: Severe left atrial enlargement.  Left Ventricle: Severe global left ventricular systolic  dysfunction. Normal left ventricular internal dimensions  and wall thicknesses. Grade II diastolic dysfunction.  Right Heart: Moderate right atrial enlargement. Right  ventricular enlargement with decreased RV systolic function  (TAPSE 1.3cm). There is moderate tricuspid regurgitation.  There is mild pulmonic regurgitation.  Pericardium/PleuraNormal pericardium with no pericardial  effusion.  Hemodynamic: RV systolic pressure is moderately increased  at  51 mm Hg.  ------------------------------------------------------------------------  CONCLUSIONS:  1. Normal mitral valve. Mild mitral regurgitation.  2. Normal trileaflet aortic valve. Mild aortic  insufficiency.  3. Aortic Root: 3.1 cm.  4. Severe left atrial enlargement.  5. Severe global left ventricular systolic dysfunction.  6. Grade II diastolic dysfunction.  7. Moderate right atrial enlargement.  8. Right ventricular enlargement with decreased RV systolic  function (TAPSE 1.3cm).  9. RV systolic pressure is moderately increased at  51 mm  Hg.  10. There is moderate tricuspid regurgitation.  11. There is mild pulmonic regurgitation.  12. Normal pericardium with no pericardial effusion.    ------------------------------------------------------------------------  Confirmed on  2018 - 07:28:09 by Ni Chavarria MD  ------------------------------------------------------------------------    < end of copied text >        146<H>  |  113<H>  |  49<H>  ----------------------------<  122<H>  3.8   |  25  |  1.55<H>    Ca    8.0<L>      2018 07:11  Phos  3.9       Mg     2.5         TPro  6.8  /  Alb  2.4<L>  /  TBili  1.3<H>  /  DBili  x   /  AST  26  /  ALT  20  /  AlkPhos  199<H>        Urinalysis Basic - ( 2018 18:03 )    Color: Yellow / Appearance: Clear / S.020 / pH: x  Gluc: x / Ketone: Negative  / Bili: Negative / Urobili: 4   Blood: x / Protein: 15 / Nitrite: Negative   Leuk Esterase: Trace / RBC: 2-5 /HPF / WBC 6-10 /HPF   Sq Epi: x / Non Sq Epi: Few /HPF / Bacteria: Many /HPF      CAPILLARY BLOOD GLUCOSE            RADIOLOGY & ADDITIONAL TESTS:    < from: Xray Chest 1 View- PORTABLE-Routine (18 @ 09:55) >    INTERPRETATION:  AP semierect chest on 2018 at 9:05 AM.   Prior study of  showed vascular congestion and heart   enlargement.    On present film heart enlargement again noted.    Central congestion off the right hilum again noted possibly slightly   improved from .    Heart obscures most of the medial and lower left lung on both studies.    IMPRESSION: Possible slight improvement in right-sided congestive   findings. Left lung largely obscured.          Imaging Personally Reviewed:  YES    Consultant(s) Notes Reviewed:   YES    Care Discussed with Consultants : yes    Plan of care was discussed with patient and /or primary care giver; all questions and concerns were addressed and care was aligned with patient's wishes.

## 2018-11-22 NOTE — DISCHARGE NOTE ADULT - CARE PLAN
Principal Discharge DX:	UTI (urinary tract infection)  Secondary Diagnosis:	CHF (congestive heart failure)  Secondary Diagnosis:	CAD (coronary artery disease)  Secondary Diagnosis:	Atrial fibrillation  Secondary Diagnosis:	Dementia  Secondary Diagnosis:	HTN (hypertension)  Secondary Diagnosis:	Hypothyroidism Principal Discharge DX:	UTI (urinary tract infection)  Goal:	Resolution of symptoms  Assessment and plan of treatment:	Your urine analysis was positive, urine culture showed E.coli. You were treated with Rocephin. Please follow up with your PCP after 1 week of discharge.  Secondary Diagnosis:	CHF (congestive heart failure)  Assessment and plan of treatment:	Your echocardiogram shows Right ventricular enlargement with decreased RV systolic function (TAPSE 1.3cm) with EF 10%. Because of poor prognosis, palliative was consulted and discussed with your family and agreed on conservative measures for now.  Secondary Diagnosis:	CAD (coronary artery disease)  Assessment and plan of treatment:	Please continue taking your medications. Please follow up with your PCP after 1 week of discharge.  Secondary Diagnosis:	Atrial fibrillation  Assessment and plan of treatment:	Please continue taking your medications. Please follow up with your PCP after 1 week of discharge.  Secondary Diagnosis:	Dementia  Assessment and plan of treatment:	Please continue taking your medications. Please follow up with your PCP after 1 week of discharge.  Secondary Diagnosis:	HTN (hypertension)  Assessment and plan of treatment:	Continue with blood pressure medication. Maintain a healthy diet that consist of low sugar, low fat, low sodium diet. Exercise frequently if possible. Follow up with primary care physician in one week after discharge.  Secondary Diagnosis:	Hypothyroidism  Assessment and plan of treatment:	Your TSH was elevated. It is likely due to side effects from amiodarone. You are started on levothyroxine. Please continue taking your medications. Please follow up with your PCP after 1 week of discharge.

## 2018-11-22 NOTE — CHART NOTE - NSCHARTNOTEFT_GEN_A_CORE
Paged by RN that pt had an episode of BRBPR after BM. Assessed bedside, pt denies dizziness, abdominal pain, nausea, vomiting, or diarrhea. Vitals stable, abdominal examination benign.   Will send a CBC stat and follow up.  Primary team to follow up in AM

## 2018-11-22 NOTE — DISCHARGE NOTE ADULT - HOSPITAL COURSE
86F from home lives with daughter, walks with a walker with PMH of Severe Dementia, Afib (not on AC by choice), CHF echo in 2017 showed severe LV global dysfunction ,Intubations x 4 in past for CHF, CAD s/p 5 stents , HTN, osteoarthritis was brought to the ED by daughter for inability to walk and poor PO, urinary incontinence in take x 1 week. Admitted for FTT, UTI.  CT head normal, UA was +ve, UCx showed E.coli. Treated with Rocephin D4. PT recommended RUBEN.   Her CXR looked congested with elevated pro BNP, she was on Lasix 40 IV BID. Her repeat echocardiogram shows Right ventricular enlargement with decreased RV systolic function (TAPSE 1.3cm) with EF 10%.   Her TSH was elevated, low T3, likely due to amiodarone. She was started on levothyroxine 25mcg.   Her Xray showed large supra patellar effusion. She refused CT scan of left lower leg to rule out any fracture or hematoma, dopplers of lower extremity normal. Ortho drained about 60cc and gave steroid injection.  She was on Home dose of paroxetine for dementia.  She was on metoprolol 12.5 BID, amiodarone 200mg OD for Atrial fibrillation. She was not on AC, Only on Plavix and aspirin.   She was on aspirin, statin and BB for coronary artery disease.   Her A1C 6.1, she is advised to have DASH diet.  She was evaluated by palliative given her poor prognosis.   She is safe to be discharged as per attending. Please refer to hospital charts for details as this is only summary.

## 2018-11-23 DIAGNOSIS — K64.9 UNSPECIFIED HEMORRHOIDS: ICD-10-CM

## 2018-11-23 LAB
ANION GAP SERPL CALC-SCNC: 11 MMOL/L — SIGNIFICANT CHANGE UP (ref 5–17)
BASOPHILS # BLD AUTO: 0 K/UL — SIGNIFICANT CHANGE UP (ref 0–0.2)
BASOPHILS NFR BLD AUTO: 0.1 % — SIGNIFICANT CHANGE UP (ref 0–2)
BUN SERPL-MCNC: 63 MG/DL — HIGH (ref 7–18)
CALCIUM SERPL-MCNC: 8.1 MG/DL — LOW (ref 8.4–10.5)
CHLORIDE SERPL-SCNC: 111 MMOL/L — HIGH (ref 96–108)
CO2 SERPL-SCNC: 23 MMOL/L — SIGNIFICANT CHANGE UP (ref 22–31)
CREAT SERPL-MCNC: 1.89 MG/DL — HIGH (ref 0.5–1.3)
EOSINOPHIL # BLD AUTO: 0 K/UL — SIGNIFICANT CHANGE UP (ref 0–0.5)
EOSINOPHIL NFR BLD AUTO: 0.3 % — SIGNIFICANT CHANGE UP (ref 0–6)
GLUCOSE BLDC GLUCOMTR-MCNC: 106 MG/DL — HIGH (ref 70–99)
GLUCOSE BLDC GLUCOMTR-MCNC: 117 MG/DL — HIGH (ref 70–99)
GLUCOSE BLDC GLUCOMTR-MCNC: 122 MG/DL — HIGH (ref 70–99)
GLUCOSE BLDC GLUCOMTR-MCNC: 97 MG/DL — SIGNIFICANT CHANGE UP (ref 70–99)
GLUCOSE SERPL-MCNC: 116 MG/DL — HIGH (ref 70–99)
HCT VFR BLD CALC: 33 % — LOW (ref 34.5–45)
HGB BLD-MCNC: 9.9 G/DL — LOW (ref 11.5–15.5)
LYMPHOCYTES # BLD AUTO: 0.6 K/UL — LOW (ref 1–3.3)
LYMPHOCYTES # BLD AUTO: 5.3 % — LOW (ref 13–44)
MAGNESIUM SERPL-MCNC: 2.4 MG/DL — SIGNIFICANT CHANGE UP (ref 1.6–2.6)
MCHC RBC-ENTMCNC: 26 PG — LOW (ref 27–34)
MCHC RBC-ENTMCNC: 30 GM/DL — LOW (ref 32–36)
MCV RBC AUTO: 86.9 FL — SIGNIFICANT CHANGE UP (ref 80–100)
MONOCYTES # BLD AUTO: 0.9 K/UL — SIGNIFICANT CHANGE UP (ref 0–0.9)
MONOCYTES NFR BLD AUTO: 8.4 % — SIGNIFICANT CHANGE UP (ref 2–14)
NEUTROPHILS # BLD AUTO: 9.3 K/UL — HIGH (ref 1.8–7.4)
NEUTROPHILS NFR BLD AUTO: 85.8 % — HIGH (ref 43–77)
OB PNL STL: POSITIVE
PHOSPHATE SERPL-MCNC: 4.2 MG/DL — SIGNIFICANT CHANGE UP (ref 2.5–4.5)
PLATELET # BLD AUTO: 251 K/UL — SIGNIFICANT CHANGE UP (ref 150–400)
POTASSIUM SERPL-MCNC: 3.9 MMOL/L — SIGNIFICANT CHANGE UP (ref 3.5–5.3)
POTASSIUM SERPL-SCNC: 3.9 MMOL/L — SIGNIFICANT CHANGE UP (ref 3.5–5.3)
RBC # BLD: 3.8 M/UL — SIGNIFICANT CHANGE UP (ref 3.8–5.2)
RBC # FLD: 17.2 % — HIGH (ref 10.3–14.5)
SODIUM SERPL-SCNC: 145 MMOL/L — SIGNIFICANT CHANGE UP (ref 135–145)
WBC # BLD: 10.9 K/UL — HIGH (ref 3.8–10.5)
WBC # FLD AUTO: 10.9 K/UL — HIGH (ref 3.8–10.5)

## 2018-11-23 RX ADMIN — Medication 40 MILLIGRAM(S): at 05:57

## 2018-11-23 RX ADMIN — Medication 25 MICROGRAM(S): at 05:57

## 2018-11-23 RX ADMIN — Medication 10 MILLIGRAM(S): at 13:37

## 2018-11-23 RX ADMIN — INFLUENZA VIRUS VACCINE 0.5 MILLILITER(S): 15; 15; 15; 15 SUSPENSION INTRAMUSCULAR at 16:30

## 2018-11-23 RX ADMIN — AMIODARONE HYDROCHLORIDE 200 MILLIGRAM(S): 400 TABLET ORAL at 05:57

## 2018-11-23 RX ADMIN — ENOXAPARIN SODIUM 30 MILLIGRAM(S): 100 INJECTION SUBCUTANEOUS at 13:37

## 2018-11-23 RX ADMIN — CEFTRIAXONE 100 GRAM(S): 500 INJECTION, POWDER, FOR SOLUTION INTRAMUSCULAR; INTRAVENOUS at 22:05

## 2018-11-23 RX ADMIN — Medication 40 MILLIGRAM(S): at 17:16

## 2018-11-23 RX ADMIN — Medication 81 MILLIGRAM(S): at 13:37

## 2018-11-23 RX ADMIN — Medication 12.5 MILLIGRAM(S): at 17:16

## 2018-11-23 RX ADMIN — Medication 12.5 MILLIGRAM(S): at 05:57

## 2018-11-23 NOTE — PROGRESS NOTE ADULT - SUBJECTIVE AND OBJECTIVE BOX
Patient is a 86y old  Female who presents with a chief complaint of FTT , CHF (2018 15:06)      INTERVAL HPI/OVERNIGHT EVENTS: offers no specific  complaints, no acute overnight events but weak  .MEDICATIONS  (STANDING):  acetaminophen  IVPB .. 1000 milliGRAM(s) IV Intermittent once  amiodarone    Tablet 200 milliGRAM(s) Oral daily  aspirin  chewable 81 milliGRAM(s) Oral daily  cefTRIAXone   IVPB 1 Gram(s) IV Intermittent every 24 hours  enoxaparin Injectable 30 milliGRAM(s) SubCutaneous daily  ergocalciferol 43784 Unit(s) Oral <User Schedule>  furosemide   Injectable 40 milliGRAM(s) IV Push every 12 hours  influenza   Vaccine 0.5 milliLiter(s) IntraMuscular once  insulin lispro (HumaLOG) corrective regimen sliding scale   SubCutaneous three times a day before meals  levothyroxine 25 MICROGram(s) Oral daily  methylPREDNISolone acetate Injectable 40 milliGRAM(s) IntraArticular once  metoprolol tartrate 12.5 milliGRAM(s) Oral two times a day  PARoxetine 10 milliGRAM(s) Oral daily    MEDICATIONS  (PRN):  acetaminophen   Tablet .. 650 milliGRAM(s) Oral every 6 hours PRN Temp greater or equal to 38C (100.4F), Moderate Pain (4 - 6)      __________________________________________________  .Vital Signs Last 24 Hrs  T(C): 36.4 (2018 07:04), Max: 36.8 (2018 23:28)  T(F): 97.6 (2018 07:04), Max: 98.3 (2018 23:28)  HR: 50 (2018 07:04) (49 - 80)  BP: 120/40 (2018 07:04) (106/46 - 136/60)  BP(mean): --  RR: 18 (2018 07:04) (17 - 18)  SpO2: 97% (2018 07:04) (95% - 100%)    PHYSICAL EXAM:  GENERAL: NAD  HEENT: Normocephalic;  conjunctivae and sclerae clear; moist mucous membranes;   NECK : supple  CHEST/LUNG: Clear to auscultation bilaterally with good air entry   HEART: S1 S2  regular; no murmurs, gallops or rubs  ABDOMEN: Soft, Nontender, Nondistended; Bowel sounds present  EXTREMITIES: Rt. leg bandaged, no cyanosis; no edema; no calf tenderness  SKIN: warm and dry; no rash  NERVOUS SYSTEM:  Awake and alert; no new deficits    _________________________________________________  LABS:                        9.9    10.9  )-----------( 251      ( 2018 05:56 )             33.0     11    145  |  111<H>  |  63<H>  ----------------------------<  116<H>  3.9   |  23  |  1.89<H>    Ca    8.1<L>      2018 05:56  Phos  4.2     -  Mg     2.4                                 LABS:                        9.5    9.9   )-----------( 235      ( 2018 06:55 )             31.8         143  |  109<H>  |  64<H>  ----------------------------<  135<H>  3.6   |  23  |  1.89<H>    Ca    7.8<L>      2018 06:55  Phos  4.0       Mg     2.5                       Serum Pro-Brain Natriuretic Peptide: 38705 pg/mL (18 @ 15:17)            LABS:                        10.0   9.2   )-----------( 277      ( 2018 15:03 )             33.8   < from: Transthoracic Echocardiogram (18 @ 07:14) >  LVMI: 102 g/m2  RWT: 0.23  Ejection Fraction Visual Estimate: 10 %    ------------------------------------------------------------------------  OBSERVATIONS:  Mitral Valve: Normal mitral valve. Mild mitral  regurgitation.  Aortic Root: Aortic Root: 3.1 cm.    Aortic Valve: Normal trileaflet aortic valve. Mild aortic  insufficiency.  Left Atrium: Severe left atrial enlargement.  Left Ventricle: Severe global left ventricular systolic  dysfunction. Normal left ventricular internal dimensions  and wall thicknesses. Grade II diastolic dysfunction.  Right Heart: Moderate right atrial enlargement. Right  ventricular enlargement with decreased RV systolic function  (TAPSE 1.3cm). There is moderate tricuspid regurgitation.  There is mild pulmonic regurgitation.  Pericardium/PleuraNormal pericardium with no pericardial  effusion.  Hemodynamic: RV systolic pressure is moderately increased  at  51 mm Hg.  ------------------------------------------------------------------------  CONCLUSIONS:  1. Normal mitral valve. Mild mitral regurgitation.  2. Normal trileaflet aortic valve. Mild aortic  insufficiency.  3. Aortic Root: 3.1 cm.  4. Severe left atrial enlargement.  5. Severe global left ventricular systolic dysfunction.  6. Grade II diastolic dysfunction.  7. Moderate right atrial enlargement.  8. Right ventricular enlargement with decreased RV systolic  function (TAPSE 1.3cm).  9. RV systolic pressure is moderately increased at  51 mm  Hg.  10. There is moderate tricuspid regurgitation.  11. There is mild pulmonic regurgitation.  12. Normal pericardium with no pericardial effusion.    ------------------------------------------------------------------------  Confirmed on  2018 - 07:28:09 by Ni Chavarria MD  ------------------------------------------------------------------------    < end of copied text >        146<H>  |  113<H>  |  49<H>  ----------------------------<  122<H>  3.8   |  25  |  1.55<H>    Ca    8.0<L>      2018 07:11  Phos  3.9       Mg     2.5         TPro  6.8  /  Alb  2.4<L>  /  TBili  1.3<H>  /  DBili  x   /  AST  26  /  ALT  20  /  AlkPhos  199<H>        Urinalysis Basic - ( 2018 18:03 )    Color: Yellow / Appearance: Clear / S.020 / pH: x  Gluc: x / Ketone: Negative  / Bili: Negative / Urobili: 4   Blood: x / Protein: 15 / Nitrite: Negative   Leuk Esterase: Trace / RBC: 2-5 /HPF / WBC 6-10 /HPF   Sq Epi: x / Non Sq Epi: Few /HPF / Bacteria: Many /HPF      CAPILLARY BLOOD GLUCOSE            RADIOLOGY & ADDITIONAL TESTS:    < from: Xray Chest 1 View- PORTABLE-Routine (18 @ 09:55) >    INTERPRETATION:  AP semierect chest on 2018 at 9:05 AM.   Prior study of  showed vascular congestion and heart   enlargement.    On present film heart enlargement again noted.    Central congestion off the right hilum again noted possibly slightly   improved from .    Heart obscures most of the medial and lower left lung on both studies.    IMPRESSION: Possible slight improvement in right-sided congestive   findings. Left lung largely obscured.          Imaging Personally Reviewed:  YES    Consultant(s) Notes Reviewed:   YES    Care Discussed with Consultants : yes    Plan of care was discussed with patient and /or primary care giver; all questions and concerns were addressed and care was aligned with patient's wishes.

## 2018-11-23 NOTE — PROGRESS NOTE ADULT - SUBJECTIVE AND OBJECTIVE BOX
PGY 1 Note discussed with supervising resident and primary attending    Patient is a 86y old  Female who presents with a chief complaint of FTT , CHF (23 Nov 2018 08:04)      INTERVAL HPI/OVERNIGHT EVENTS: BRBPR 1 episode, Hb stable, FOBT positive, has hemorrhoids too    MEDICATIONS  (STANDING):  acetaminophen  IVPB .. 1000 milliGRAM(s) IV Intermittent once  amiodarone    Tablet 200 milliGRAM(s) Oral daily  aspirin  chewable 81 milliGRAM(s) Oral daily  cefTRIAXone   IVPB 1 Gram(s) IV Intermittent every 24 hours  enoxaparin Injectable 30 milliGRAM(s) SubCutaneous daily  ergocalciferol 52015 Unit(s) Oral <User Schedule>  furosemide   Injectable 40 milliGRAM(s) IV Push every 12 hours  influenza   Vaccine 0.5 milliLiter(s) IntraMuscular once  insulin lispro (HumaLOG) corrective regimen sliding scale   SubCutaneous three times a day before meals  levothyroxine 25 MICROGram(s) Oral daily  methylPREDNISolone acetate Injectable 40 milliGRAM(s) IntraArticular once  metoprolol tartrate 12.5 milliGRAM(s) Oral two times a day  PARoxetine 10 milliGRAM(s) Oral daily    MEDICATIONS  (PRN):  acetaminophen   Tablet .. 650 milliGRAM(s) Oral every 6 hours PRN Temp greater or equal to 38C (100.4F), Moderate Pain (4 - 6)      __________________________________________________  REVIEW OF SYSTEMS:    CONSTITUTIONAL: No fever,   EYES: no acute visual disturbances  NECK: No pain or stiffness  RESPIRATORY: No cough; No shortness of breath  CARDIOVASCULAR: No chest pain, no palpitations  GASTROINTESTINAL: No pain. No nausea or vomiting; No diarrhea   NEUROLOGICAL: No headache or numbness, no tremors  MUSCULOSKELETAL: No joint pain, no muscle pain  GENITOURINARY: no dysuria, no frequency, no hesitancy  PSYCHIATRY: no depression , no anxiety  ALL OTHER  ROS negative        Vital Signs Last 24 Hrs  T(C): 36.8 (23 Nov 2018 15:23), Max: 36.9 (23 Nov 2018 11:04)  T(F): 98.3 (23 Nov 2018 15:23), Max: 98.5 (23 Nov 2018 11:04)  HR: 51 (23 Nov 2018 15:23) (49 - 100)  BP: 119/52 (23 Nov 2018 15:23) (111/51 - 129/67)  BP(mean): --  RR: 18 (23 Nov 2018 15:23) (17 - 18)  SpO2: 99% (23 Nov 2018 15:23) (95% - 100%)    ________________________________________________  PHYSICAL EXAM:  GENERAL: NAD  HEENT: Normocephalic;  conjunctivae and sclerae clear; moist mucous membranes;   NECK : supple  CHEST/LUNG: Clear to auscultation bilaterally with good air entry   HEART: S1 S2  regular; no murmurs, gallops or rubs  ABDOMEN: Soft, Nontender, Nondistended; Bowel sounds present  EXTREMITIES: Rt. leg bandaged, no cyanosis; no edema; no calf tenderness  SKIN: warm and dry; no rash  NERVOUS SYSTEM:  Awake and alert; no new deficits    _________________________________________________  LABS:                        9.9    10.9  )-----------( 251      ( 23 Nov 2018 05:56 )             33.0     11-23    145  |  111<H>  |  63<H>  ----------------------------<  116<H>  3.9   |  23  |  1.89<H>    Ca    8.1<L>      23 Nov 2018 05:56  Phos  4.2     11-23  Mg     2.4     11-23          CAPILLARY BLOOD GLUCOSE      POCT Blood Glucose.: 97 mg/dL (23 Nov 2018 11:42)  POCT Blood Glucose.: 117 mg/dL (23 Nov 2018 08:00)  POCT Blood Glucose.: 133 mg/dL (22 Nov 2018 21:04)  POCT Blood Glucose.: 228 mg/dL (22 Nov 2018 17:09)        RADIOLOGY & ADDITIONAL TESTS:  < from: Xray Chest 1 View- PORTABLE-Routine (11.21.18 @ 09:55) >    INTERPRETATION:  AP semierect chest on November 21, 2018 at 9:05 AM.   Prior study of November 19 showed vascular congestion and heart   enlargement.    On present film heart enlargement again noted.    Central congestion off the right hilum again noted possibly slightly   improved from November 19.    Heart obscures most of the medial and lower left lung on both studies.    IMPRESSION: Possible slight improvement in right-sided congestive   findings. Left lung largely obscured.      Imaging Personally Reviewed:  YES/NO    Consultant(s) Notes Reviewed:   YES/ No    Care Discussed with Consultants :     Plan of care was discussed with patient and /or primary care giver; all questions and concerns were addressed and care was aligned with patient's wishes.

## 2018-11-24 LAB
ANION GAP SERPL CALC-SCNC: 8 MMOL/L — SIGNIFICANT CHANGE UP (ref 5–17)
BASOPHILS # BLD AUTO: 0 K/UL — SIGNIFICANT CHANGE UP (ref 0–0.2)
BASOPHILS NFR BLD AUTO: 0.3 % — SIGNIFICANT CHANGE UP (ref 0–2)
BUN SERPL-MCNC: 60 MG/DL — HIGH (ref 7–18)
CALCIUM SERPL-MCNC: 8.1 MG/DL — LOW (ref 8.4–10.5)
CHLORIDE SERPL-SCNC: 110 MMOL/L — HIGH (ref 96–108)
CO2 SERPL-SCNC: 28 MMOL/L — SIGNIFICANT CHANGE UP (ref 22–31)
CREAT SERPL-MCNC: 1.65 MG/DL — HIGH (ref 0.5–1.3)
EOSINOPHIL # BLD AUTO: 0.2 K/UL — SIGNIFICANT CHANGE UP (ref 0–0.5)
EOSINOPHIL NFR BLD AUTO: 2.4 % — SIGNIFICANT CHANGE UP (ref 0–6)
GLUCOSE BLDC GLUCOMTR-MCNC: 100 MG/DL — HIGH (ref 70–99)
GLUCOSE BLDC GLUCOMTR-MCNC: 123 MG/DL — HIGH (ref 70–99)
GLUCOSE BLDC GLUCOMTR-MCNC: 136 MG/DL — HIGH (ref 70–99)
GLUCOSE BLDC GLUCOMTR-MCNC: 153 MG/DL — HIGH (ref 70–99)
GLUCOSE BLDC GLUCOMTR-MCNC: 224 MG/DL — HIGH (ref 70–99)
GLUCOSE SERPL-MCNC: 85 MG/DL — SIGNIFICANT CHANGE UP (ref 70–99)
HCT VFR BLD CALC: 31.6 % — LOW (ref 34.5–45)
HGB BLD-MCNC: 9.3 G/DL — LOW (ref 11.5–15.5)
LYMPHOCYTES # BLD AUTO: 0.7 K/UL — LOW (ref 1–3.3)
LYMPHOCYTES # BLD AUTO: 8 % — LOW (ref 13–44)
MCHC RBC-ENTMCNC: 25.8 PG — LOW (ref 27–34)
MCHC RBC-ENTMCNC: 29.6 GM/DL — LOW (ref 32–36)
MCV RBC AUTO: 87.1 FL — SIGNIFICANT CHANGE UP (ref 80–100)
MONOCYTES # BLD AUTO: 0.7 K/UL — SIGNIFICANT CHANGE UP (ref 0–0.9)
MONOCYTES NFR BLD AUTO: 8.8 % — SIGNIFICANT CHANGE UP (ref 2–14)
NEUTROPHILS # BLD AUTO: 6.8 K/UL — SIGNIFICANT CHANGE UP (ref 1.8–7.4)
NEUTROPHILS NFR BLD AUTO: 80.5 % — HIGH (ref 43–77)
PLATELET # BLD AUTO: 234 K/UL — SIGNIFICANT CHANGE UP (ref 150–400)
POTASSIUM SERPL-MCNC: 4.1 MMOL/L — SIGNIFICANT CHANGE UP (ref 3.5–5.3)
POTASSIUM SERPL-SCNC: 4.1 MMOL/L — SIGNIFICANT CHANGE UP (ref 3.5–5.3)
RBC # BLD: 3.62 M/UL — LOW (ref 3.8–5.2)
RBC # FLD: 17 % — HIGH (ref 10.3–14.5)
SODIUM SERPL-SCNC: 146 MMOL/L — HIGH (ref 135–145)
WBC # BLD: 8.5 K/UL — SIGNIFICANT CHANGE UP (ref 3.8–10.5)
WBC # FLD AUTO: 8.5 K/UL — SIGNIFICANT CHANGE UP (ref 3.8–10.5)

## 2018-11-24 RX ADMIN — ENOXAPARIN SODIUM 30 MILLIGRAM(S): 100 INJECTION SUBCUTANEOUS at 11:19

## 2018-11-24 RX ADMIN — Medication 12.5 MILLIGRAM(S): at 17:23

## 2018-11-24 RX ADMIN — Medication 81 MILLIGRAM(S): at 11:19

## 2018-11-24 RX ADMIN — AMIODARONE HYDROCHLORIDE 200 MILLIGRAM(S): 400 TABLET ORAL at 05:34

## 2018-11-24 RX ADMIN — Medication 10 MILLIGRAM(S): at 11:20

## 2018-11-24 RX ADMIN — CEFTRIAXONE 100 GRAM(S): 500 INJECTION, POWDER, FOR SOLUTION INTRAMUSCULAR; INTRAVENOUS at 22:24

## 2018-11-24 RX ADMIN — Medication 25 MICROGRAM(S): at 05:34

## 2018-11-24 RX ADMIN — Medication 1: at 17:22

## 2018-11-24 RX ADMIN — Medication 40 MILLIGRAM(S): at 17:25

## 2018-11-24 RX ADMIN — Medication 40 MILLIGRAM(S): at 05:34

## 2018-11-24 NOTE — PROGRESS NOTE ADULT - SUBJECTIVE AND OBJECTIVE BOX
PGY 1 Note discussed with supervising resident and primary attending    Patient is a 86y old  Female who presents with a chief complaint of FTT , CHF (24 Nov 2018 15:00)      INTERVAL HPI/OVERNIGHT EVENTS: no acute ovenight events, awaiting placement    MEDICATIONS  (STANDING):  acetaminophen  IVPB .. 1000 milliGRAM(s) IV Intermittent once  amiodarone    Tablet 200 milliGRAM(s) Oral daily  aspirin  chewable 81 milliGRAM(s) Oral daily  cefTRIAXone   IVPB 1 Gram(s) IV Intermittent every 24 hours  enoxaparin Injectable 30 milliGRAM(s) SubCutaneous daily  ergocalciferol 76077 Unit(s) Oral <User Schedule>  furosemide   Injectable 40 milliGRAM(s) IV Push every 12 hours  insulin lispro (HumaLOG) corrective regimen sliding scale   SubCutaneous three times a day before meals  levothyroxine 25 MICROGram(s) Oral daily  methylPREDNISolone acetate Injectable 40 milliGRAM(s) IntraArticular once  metoprolol tartrate 12.5 milliGRAM(s) Oral two times a day  PARoxetine 10 milliGRAM(s) Oral daily    MEDICATIONS  (PRN):  acetaminophen   Tablet .. 650 milliGRAM(s) Oral every 6 hours PRN Temp greater or equal to 38C (100.4F), Moderate Pain (4 - 6)      __________________________________________________  REVIEW OF SYSTEMS:    CONSTITUTIONAL: No fever,   EYES: no acute visual disturbances  NECK: No pain or stiffness  RESPIRATORY: No cough; No shortness of breath  CARDIOVASCULAR: No chest pain, no palpitations  GASTROINTESTINAL: No pain. No nausea or vomiting; No diarrhea   NEUROLOGICAL: No headache or numbness, no tremors  MUSCULOSKELETAL: No joint pain, no muscle pain  GENITOURINARY: no dysuria, no frequency, no hesitancy  PSYCHIATRY: no depression , no anxiety  ALL OTHER  ROS negative        Vital Signs Last 24 Hrs  T(C): 36.8 (24 Nov 2018 15:41), Max: 36.8 (24 Nov 2018 15:41)  T(F): 98.2 (24 Nov 2018 15:41), Max: 98.2 (24 Nov 2018 15:41)  HR: 55 (24 Nov 2018 15:41) (47 - 55)  BP: 131/61 (24 Nov 2018 15:41) (104/42 - 131/61)  BP(mean): --  RR: 18 (24 Nov 2018 15:41) (17 - 18)  SpO2: 94% (24 Nov 2018 15:41) (94% - 99%)    ________________________________________________  PHYSICAL EXAM:  GENERAL: NAD  HEENT: Normocephalic;  conjunctivae and sclerae clear; moist mucous membranes;   NECK : supple  CHEST/LUNG: Clear to auscultation bilaterally with good air entry   HEART: S1 S2  regular; no murmurs, gallops or rubs  ABDOMEN: Soft, Nontender, Nondistended; Bowel sounds present  EXTREMITIES: no cyanosis; no edema; no calf tenderness  SKIN: warm and dry; no rash  NERVOUS SYSTEM:  Awake and alert; no new deficits    _________________________________________________  LABS:                        9.3    8.5   )-----------( 234      ( 24 Nov 2018 07:55 )             31.6     11-24    146<H>  |  110<H>  |  60<H>  ----------------------------<  85  4.1   |  28  |  1.65<H>    Ca    8.1<L>      24 Nov 2018 07:55  Phos  4.2     11-23  Mg     2.4     11-23          CAPILLARY BLOOD GLUCOSE      POCT Blood Glucose.: 153 mg/dL (24 Nov 2018 16:44)  POCT Blood Glucose.: 136 mg/dL (24 Nov 2018 12:02)  POCT Blood Glucose.: 100 mg/dL (24 Nov 2018 08:03)  POCT Blood Glucose.: 122 mg/dL (23 Nov 2018 21:08)        RADIOLOGY & ADDITIONAL TESTS:    Imaging Personally Reviewed:  YES/NO    Consultant(s) Notes Reviewed:   YES/ No    Care Discussed with Consultants :     Plan of care was discussed with patient and /or primary care giver; all questions and concerns were addressed and care was aligned with patient's wishes.

## 2018-11-24 NOTE — PROGRESS NOTE ADULT - SUBJECTIVE AND OBJECTIVE BOX
PGY 1 Note discussed with supervising resident and primary attending    Patient is a 86y old  Female who presents with a chief complaint of FTT , CHF       INTERVAL HPI/OVERNIGHT EVENTS: BRBPR 1 episode yesterday ,none today , Hb stable, FOBT positive, has hemorrhoids too,     .MEDICATIONS  (STANDING):  acetaminophen  IVPB .. 1000 milliGRAM(s) IV Intermittent once  amiodarone    Tablet 200 milliGRAM(s) Oral daily  aspirin  chewable 81 milliGRAM(s) Oral daily  cefTRIAXone   IVPB 1 Gram(s) IV Intermittent every 24 hours  enoxaparin Injectable 30 milliGRAM(s) SubCutaneous daily  ergocalciferol 00179 Unit(s) Oral <User Schedule>  furosemide   Injectable 40 milliGRAM(s) IV Push every 12 hours  insulin lispro (HumaLOG) corrective regimen sliding scale   SubCutaneous three times a day before meals  levothyroxine 25 MICROGram(s) Oral daily  methylPREDNISolone acetate Injectable 40 milliGRAM(s) IntraArticular once  metoprolol tartrate 12.5 milliGRAM(s) Oral two times a day  PARoxetine 10 milliGRAM(s) Oral daily    MEDICATIONS  (PRN):  acetaminophen   Tablet .. 650 milliGRAM(s) Oral every 6 hours PRN Temp greater or equal to 38C (100.4F), Moderate Pain (4 - 6)      __________________________________________________  REVIEW OF SYSTEMS:    CONSTITUTIONAL: No fever,   EYES: no acute visual disturbances  NECK: No pain or stiffness  RESPIRATORY: No cough; No shortness of breath  CARDIOVASCULAR: No chest pain, no palpitations  GASTROINTESTINAL: No pain. No nausea or vomiting; No diarrhea   NEUROLOGICAL: No headache or numbness, no tremors  MUSCULOSKELETAL: No joint pain, no muscle pain  GENITOURINARY: no dysuria, no frequency, no hesitancy  PSYCHIATRY: no depression , no anxiety  ALL OTHER  ROS negative      .Vital Signs Last 24 Hrs  T(C): 36.2 (24 Nov 2018 11:23), Max: 36.8 (23 Nov 2018 15:23)  T(F): 97.2 (24 Nov 2018 11:23), Max: 98.3 (23 Nov 2018 15:23)  HR: 54 (24 Nov 2018 11:23) (47 - 54)  BP: 104/42 (24 Nov 2018 11:23) (104/42 - 123/53)  BP(mean): --  RR: 18 (24 Nov 2018 11:23) (17 - 18)  SpO2: 98% (24 Nov 2018 11:23) (97% - 99%)  ________________________________________________  PHYSICAL EXAM:  GENERAL: NAD  HEENT: Normocephalic;  conjunctivae and sclerae clear; moist mucous membranes;   NECK : supple  CHEST/LUNG: Clear to auscultation bilaterally with good air entry   HEART: S1 S2  regular; no murmurs, gallops or rubs  ABDOMEN: Soft, Nontender, Nondistended; Bowel sounds present  EXTREMITIES: Rt. leg bandaged, no cyanosis; no edema; no calf tenderness  SKIN: warm and dry; no rash  NERVOUS SYSTEM:  Awake and alert; no new deficits    _________________________________________________  LABS:                        9.3    8.5   )-----------( 234      ( 24 Nov 2018 07:55 )             31.6     11-24    146<H>  |  110<H>  |  60<H>  ----------------------------<  85  4.1   |  28  |  1.65<H>    Ca    8.1<L>      24 Nov 2018 07:55  Phos  4.2     11-23  Mg     2.4     11-23                            LABS:                        9.9    10.9  )-----------( 251      ( 23 Nov 2018 05:56 )             33.0     11-23    145  |  111<H>  |  63<H>  ----------------------------<  116<H>  3.9   |  23  |  1.89<H>    Ca    8.1<L>      23 Nov 2018 05:56  Phos  4.2     11-23  Mg     2.4     11-23          CAPILLARY BLOOD GLUCOSE      POCT Blood Glucose.: 97 mg/dL (23 Nov 2018 11:42)  POCT Blood Glucose.: 117 mg/dL (23 Nov 2018 08:00)  POCT Blood Glucose.: 133 mg/dL (22 Nov 2018 21:04)  POCT Blood Glucose.: 228 mg/dL (22 Nov 2018 17:09)        RADIOLOGY & ADDITIONAL TESTS:  < from: Xray Chest 1 View- PORTABLE-Routine (11.21.18 @ 09:55) >    INTERPRETATION:  AP semierect chest on November 21, 2018 at 9:05 AM.   Prior study of November 19 showed vascular congestion and heart   enlargement.    On present film heart enlargement again noted.    Central congestion off the right hilum again noted possibly slightly   improved from November 19.    Heart obscures most of the medial and lower left lung on both studies.    IMPRESSION: Possible slight improvement in right-sided congestive   findings. Left lung largely obscured.      Imaging Personally Reviewed:  YES/NO    Consultant(s) Notes Reviewed:   YES/ No    Care Discussed with Consultants :     Plan of care was discussed with patient and /or primary care giver; all questions and concerns were addressed and care was aligned with patient's wishes.

## 2018-11-25 LAB
ANION GAP SERPL CALC-SCNC: 8 MMOL/L — SIGNIFICANT CHANGE UP (ref 5–17)
BUN SERPL-MCNC: 49 MG/DL — HIGH (ref 7–18)
CALCIUM SERPL-MCNC: 8.2 MG/DL — LOW (ref 8.4–10.5)
CHLORIDE SERPL-SCNC: 109 MMOL/L — HIGH (ref 96–108)
CO2 SERPL-SCNC: 28 MMOL/L — SIGNIFICANT CHANGE UP (ref 22–31)
CREAT SERPL-MCNC: 1.38 MG/DL — HIGH (ref 0.5–1.3)
CULTURE RESULTS: SIGNIFICANT CHANGE UP
CULTURE RESULTS: SIGNIFICANT CHANGE UP
GLUCOSE BLDC GLUCOMTR-MCNC: 114 MG/DL — HIGH (ref 70–99)
GLUCOSE BLDC GLUCOMTR-MCNC: 148 MG/DL — HIGH (ref 70–99)
GLUCOSE BLDC GLUCOMTR-MCNC: 171 MG/DL — HIGH (ref 70–99)
GLUCOSE BLDC GLUCOMTR-MCNC: 88 MG/DL — SIGNIFICANT CHANGE UP (ref 70–99)
GLUCOSE SERPL-MCNC: 86 MG/DL — SIGNIFICANT CHANGE UP (ref 70–99)
HCT VFR BLD CALC: 33 % — LOW (ref 34.5–45)
HGB BLD-MCNC: 10.1 G/DL — LOW (ref 11.5–15.5)
MCHC RBC-ENTMCNC: 26.1 PG — LOW (ref 27–34)
MCHC RBC-ENTMCNC: 30.5 GM/DL — LOW (ref 32–36)
MCV RBC AUTO: 85.6 FL — SIGNIFICANT CHANGE UP (ref 80–100)
PLATELET # BLD AUTO: 248 K/UL — SIGNIFICANT CHANGE UP (ref 150–400)
POTASSIUM SERPL-MCNC: 3.2 MMOL/L — LOW (ref 3.5–5.3)
POTASSIUM SERPL-SCNC: 3.2 MMOL/L — LOW (ref 3.5–5.3)
RBC # BLD: 3.86 M/UL — SIGNIFICANT CHANGE UP (ref 3.8–5.2)
RBC # FLD: 16.8 % — HIGH (ref 10.3–14.5)
SODIUM SERPL-SCNC: 145 MMOL/L — SIGNIFICANT CHANGE UP (ref 135–145)
SPECIMEN SOURCE: SIGNIFICANT CHANGE UP
SPECIMEN SOURCE: SIGNIFICANT CHANGE UP
WBC # BLD: 10.2 K/UL — SIGNIFICANT CHANGE UP (ref 3.8–10.5)
WBC # FLD AUTO: 10.2 K/UL — SIGNIFICANT CHANGE UP (ref 3.8–10.5)

## 2018-11-25 RX ORDER — POTASSIUM CHLORIDE 20 MEQ
40 PACKET (EA) ORAL EVERY 4 HOURS
Qty: 0 | Refills: 0 | Status: COMPLETED | OUTPATIENT
Start: 2018-11-25 | End: 2018-11-25

## 2018-11-25 RX ADMIN — Medication 40 MILLIGRAM(S): at 05:15

## 2018-11-25 RX ADMIN — Medication 1: at 17:08

## 2018-11-25 RX ADMIN — Medication 12.5 MILLIGRAM(S): at 05:15

## 2018-11-25 RX ADMIN — Medication 25 MICROGRAM(S): at 05:15

## 2018-11-25 RX ADMIN — Medication 650 MILLIGRAM(S): at 05:13

## 2018-11-25 RX ADMIN — Medication 10 MILLIGRAM(S): at 11:23

## 2018-11-25 RX ADMIN — AMIODARONE HYDROCHLORIDE 200 MILLIGRAM(S): 400 TABLET ORAL at 05:15

## 2018-11-25 RX ADMIN — ENOXAPARIN SODIUM 30 MILLIGRAM(S): 100 INJECTION SUBCUTANEOUS at 11:23

## 2018-11-25 RX ADMIN — Medication 40 MILLIEQUIVALENT(S): at 17:03

## 2018-11-25 RX ADMIN — Medication 81 MILLIGRAM(S): at 11:23

## 2018-11-25 RX ADMIN — Medication 40 MILLIEQUIVALENT(S): at 17:35

## 2018-11-25 RX ADMIN — CEFTRIAXONE 100 GRAM(S): 500 INJECTION, POWDER, FOR SOLUTION INTRAMUSCULAR; INTRAVENOUS at 22:28

## 2018-11-25 NOTE — PROGRESS NOTE ADULT - SUBJECTIVE AND OBJECTIVE BOX
Patient is a 86y old  Female who presents with a chief complaint of FTT , CHF (24 Nov 2018 15:00)      INTERVAL HPI/OVERNIGHT EVENTS: no acute ovenight events, awaiting placement, had some discomfort    MEDICATIONS  (STANDING):  acetaminophen  IVPB .. 1000 milliGRAM(s) IV Intermittent once  amiodarone    Tablet 200 milliGRAM(s) Oral daily  aspirin  chewable 81 milliGRAM(s) Oral daily  cefTRIAXone   IVPB 1 Gram(s) IV Intermittent every 24 hours  enoxaparin Injectable 30 milliGRAM(s) SubCutaneous daily  ergocalciferol 22619 Unit(s) Oral <User Schedule>  furosemide   Injectable 40 milliGRAM(s) IV Push every 12 hours  insulin lispro (HumaLOG) corrective regimen sliding scale   SubCutaneous three times a day before meals  levothyroxine 25 MICROGram(s) Oral daily  methylPREDNISolone acetate Injectable 40 milliGRAM(s) IntraArticular once  metoprolol tartrate 12.5 milliGRAM(s) Oral two times a day  PARoxetine 10 milliGRAM(s) Oral daily    MEDICATIONS  (PRN):  acetaminophen   Tablet .. 650 milliGRAM(s) Oral every 6 hours PRN Temp greater or equal to 38C (100.4F), Moderate Pain (4 - 6)      __________________________________________________  REVIEW OF SYSTEMS:    CONSTITUTIONAL: No fever,   EYES: no acute visual disturbances  NECK: No pain or stiffness  RESPIRATORY: No cough; No shortness of breath  CARDIOVASCULAR: No chest pain, no palpitations  GASTROINTESTINAL: No pain. No nausea or vomiting; No diarrhea   NEUROLOGICAL: No headache or numbness, no tremors  MUSCULOSKELETAL: No joint pain, no muscle pain  GENITOURINARY: no dysuria, no frequency, no hesitancy  PSYCHIATRY: no depression , no anxiety  ALL OTHER  ROS negative      .Vital Signs Last 24 Hrs  T(C): 36.3 (25 Nov 2018 07:17), Max: 36.8 (24 Nov 2018 15:41)  T(F): 97.3 (25 Nov 2018 07:17), Max: 98.2 (24 Nov 2018 15:41)  HR: 55 (25 Nov 2018 07:17) (51 - 55)  BP: 106/44 (25 Nov 2018 07:17) (104/42 - 131/61)  BP(mean): --  RR: 18 (25 Nov 2018 07:17) (18 - 18)  SpO2: 78% (25 Nov 2018 07:17) (78% - 99%)    ________________________________________________  PHYSICAL EXAM:  GENERAL: NAD  HEENT: Normocephalic;  conjunctivae and sclerae clear; moist mucous membranes;   NECK : supple  CHEST/LUNG: Clear to auscultation bilaterally with good air entry   HEART: S1 S2  regular; no murmurs, gallops or rubs  ABDOMEN: Soft, Nontender, Nondistended; Bowel sounds present  EXTREMITIES: no cyanosis; no edema; no calf tenderness  SKIN: warm and dry; no rash  NERVOUS SYSTEM:  Awake and alert; no new deficits    _________________________________________________  LABS:    LABS:                        10.1   10.2  )-----------( 248      ( 25 Nov 2018 06:31 )             33.0     11-25    145  |  109<H>  |  49<H>  ----------------------------<  86  3.2<L>   |  28  |  1.38<H>    Ca    8.2<L>      25 Nov 2018 06:31                                                  9.3    8.5   )-----------( 234      ( 24 Nov 2018 07:55 )             31.6     11-24    146<H>  |  110<H>  |  60<H>  ----------------------------<  85  4.1   |  28  |  1.65<H>    Ca    8.1<L>      24 Nov 2018 07:55  Phos  4.2     11-23  Mg     2.4     11-23      .    .  Plan of care was discussed with patient and /or primary care giver; all questions and concerns were addressed and care was aligned with patient's wishes.

## 2018-11-26 VITALS
OXYGEN SATURATION: 100 % | HEART RATE: 62 BPM | TEMPERATURE: 97 F | DIASTOLIC BLOOD PRESSURE: 71 MMHG | RESPIRATION RATE: 18 BRPM | SYSTOLIC BLOOD PRESSURE: 137 MMHG

## 2018-11-26 LAB
ANION GAP SERPL CALC-SCNC: 8 MMOL/L — SIGNIFICANT CHANGE UP (ref 5–17)
BUN SERPL-MCNC: 46 MG/DL — HIGH (ref 7–18)
CALCIUM SERPL-MCNC: 8.5 MG/DL — SIGNIFICANT CHANGE UP (ref 8.4–10.5)
CHLORIDE SERPL-SCNC: 109 MMOL/L — HIGH (ref 96–108)
CO2 SERPL-SCNC: 28 MMOL/L — SIGNIFICANT CHANGE UP (ref 22–31)
CREAT SERPL-MCNC: 1.24 MG/DL — SIGNIFICANT CHANGE UP (ref 0.5–1.3)
GLUCOSE BLDC GLUCOMTR-MCNC: 106 MG/DL — HIGH (ref 70–99)
GLUCOSE BLDC GLUCOMTR-MCNC: 108 MG/DL — HIGH (ref 70–99)
GLUCOSE SERPL-MCNC: 97 MG/DL — SIGNIFICANT CHANGE UP (ref 70–99)
HCT VFR BLD CALC: 35.2 % — SIGNIFICANT CHANGE UP (ref 34.5–45)
HGB BLD-MCNC: 10.5 G/DL — LOW (ref 11.5–15.5)
MCHC RBC-ENTMCNC: 26.5 PG — LOW (ref 27–34)
MCHC RBC-ENTMCNC: 29.8 GM/DL — LOW (ref 32–36)
MCV RBC AUTO: 88.9 FL — SIGNIFICANT CHANGE UP (ref 80–100)
PLATELET # BLD AUTO: 262 K/UL — SIGNIFICANT CHANGE UP (ref 150–400)
POTASSIUM SERPL-MCNC: 4.1 MMOL/L — SIGNIFICANT CHANGE UP (ref 3.5–5.3)
POTASSIUM SERPL-SCNC: 4.1 MMOL/L — SIGNIFICANT CHANGE UP (ref 3.5–5.3)
RBC # BLD: 3.96 M/UL — SIGNIFICANT CHANGE UP (ref 3.8–5.2)
RBC # FLD: 16.8 % — HIGH (ref 10.3–14.5)
SODIUM SERPL-SCNC: 145 MMOL/L — SIGNIFICANT CHANGE UP (ref 135–145)
WBC # BLD: 11 K/UL — HIGH (ref 3.8–10.5)
WBC # FLD AUTO: 11 K/UL — HIGH (ref 3.8–10.5)

## 2018-11-26 PROCEDURE — 82962 GLUCOSE BLOOD TEST: CPT

## 2018-11-26 PROCEDURE — 82306 VITAMIN D 25 HYDROXY: CPT

## 2018-11-26 PROCEDURE — 86850 RBC ANTIBODY SCREEN: CPT

## 2018-11-26 PROCEDURE — 83880 ASSAY OF NATRIURETIC PEPTIDE: CPT

## 2018-11-26 PROCEDURE — 93005 ELECTROCARDIOGRAM TRACING: CPT

## 2018-11-26 PROCEDURE — 86900 BLOOD TYPING SEROLOGIC ABO: CPT

## 2018-11-26 PROCEDURE — 87186 SC STD MICRODIL/AGAR DIL: CPT

## 2018-11-26 PROCEDURE — 92610 EVALUATE SWALLOWING FUNCTION: CPT

## 2018-11-26 PROCEDURE — 97110 THERAPEUTIC EXERCISES: CPT

## 2018-11-26 PROCEDURE — 81001 URINALYSIS AUTO W/SCOPE: CPT

## 2018-11-26 PROCEDURE — 85730 THROMBOPLASTIN TIME PARTIAL: CPT

## 2018-11-26 PROCEDURE — 80053 COMPREHEN METABOLIC PANEL: CPT

## 2018-11-26 PROCEDURE — 83605 ASSAY OF LACTIC ACID: CPT

## 2018-11-26 PROCEDURE — 93306 TTE W/DOPPLER COMPLETE: CPT

## 2018-11-26 PROCEDURE — 82607 VITAMIN B-12: CPT

## 2018-11-26 PROCEDURE — 80048 BASIC METABOLIC PNL TOTAL CA: CPT

## 2018-11-26 PROCEDURE — 97162 PT EVAL MOD COMPLEX 30 MIN: CPT

## 2018-11-26 PROCEDURE — 85027 COMPLETE CBC AUTOMATED: CPT

## 2018-11-26 PROCEDURE — 73562 X-RAY EXAM OF KNEE 3: CPT

## 2018-11-26 PROCEDURE — 84484 ASSAY OF TROPONIN QUANT: CPT

## 2018-11-26 PROCEDURE — 82272 OCCULT BLD FECES 1-3 TESTS: CPT

## 2018-11-26 PROCEDURE — 73610 X-RAY EXAM OF ANKLE: CPT

## 2018-11-26 PROCEDURE — 83036 HEMOGLOBIN GLYCOSYLATED A1C: CPT

## 2018-11-26 PROCEDURE — 84481 FREE ASSAY (FT-3): CPT

## 2018-11-26 PROCEDURE — 73502 X-RAY EXAM HIP UNI 2-3 VIEWS: CPT

## 2018-11-26 PROCEDURE — 82550 ASSAY OF CK (CPK): CPT

## 2018-11-26 PROCEDURE — 83735 ASSAY OF MAGNESIUM: CPT

## 2018-11-26 PROCEDURE — 85610 PROTHROMBIN TIME: CPT

## 2018-11-26 PROCEDURE — 84443 ASSAY THYROID STIM HORMONE: CPT

## 2018-11-26 PROCEDURE — 87040 BLOOD CULTURE FOR BACTERIA: CPT

## 2018-11-26 PROCEDURE — 71045 X-RAY EXAM CHEST 1 VIEW: CPT

## 2018-11-26 PROCEDURE — 73590 X-RAY EXAM OF LOWER LEG: CPT

## 2018-11-26 PROCEDURE — 84100 ASSAY OF PHOSPHORUS: CPT

## 2018-11-26 PROCEDURE — 82553 CREATINE MB FRACTION: CPT

## 2018-11-26 PROCEDURE — 70450 CT HEAD/BRAIN W/O DYE: CPT

## 2018-11-26 PROCEDURE — 86901 BLOOD TYPING SEROLOGIC RH(D): CPT

## 2018-11-26 PROCEDURE — 82009 KETONE BODYS QUAL: CPT

## 2018-11-26 PROCEDURE — 93971 EXTREMITY STUDY: CPT

## 2018-11-26 PROCEDURE — 90686 IIV4 VACC NO PRSV 0.5 ML IM: CPT

## 2018-11-26 PROCEDURE — 87086 URINE CULTURE/COLONY COUNT: CPT

## 2018-11-26 PROCEDURE — 80061 LIPID PANEL: CPT

## 2018-11-26 PROCEDURE — 99285 EMERGENCY DEPT VISIT HI MDM: CPT | Mod: 25

## 2018-11-26 PROCEDURE — 97530 THERAPEUTIC ACTIVITIES: CPT

## 2018-11-26 PROCEDURE — 84439 ASSAY OF FREE THYROXINE: CPT

## 2018-11-26 RX ORDER — ACETAMINOPHEN 500 MG
2 TABLET ORAL
Qty: 0 | Refills: 0 | COMMUNITY
Start: 2018-11-26

## 2018-11-26 RX ORDER — ACETAMINOPHEN 500 MG
100 TABLET ORAL
Qty: 0 | Refills: 0 | COMMUNITY
Start: 2018-11-26

## 2018-11-26 RX ORDER — LEVOTHYROXINE SODIUM 125 MCG
1 TABLET ORAL
Qty: 30 | Refills: 0 | OUTPATIENT
Start: 2018-11-26 | End: 2018-12-25

## 2018-11-26 RX ADMIN — AMIODARONE HYDROCHLORIDE 200 MILLIGRAM(S): 400 TABLET ORAL at 06:05

## 2018-11-26 RX ADMIN — Medication 12.5 MILLIGRAM(S): at 06:04

## 2018-11-26 RX ADMIN — ENOXAPARIN SODIUM 30 MILLIGRAM(S): 100 INJECTION SUBCUTANEOUS at 12:22

## 2018-11-26 RX ADMIN — Medication 25 MICROGRAM(S): at 06:04

## 2018-11-26 RX ADMIN — Medication 81 MILLIGRAM(S): at 12:22

## 2018-11-26 RX ADMIN — Medication 40 MILLIGRAM(S): at 06:05

## 2018-11-26 RX ADMIN — Medication 10 MILLIGRAM(S): at 12:22

## 2018-11-26 NOTE — PROGRESS NOTE ADULT - NSHPATTENDINGPLANDISCUSS_GEN_ALL_CORE
resident and patient's family.
resident and patient; s family

## 2018-11-26 NOTE — PROGRESS NOTE ADULT - SUBJECTIVE AND OBJECTIVE BOX
patient seen  and examined a  chart reviewed    Patient is a 86y old  Female who presents with a chief complaint of FTT , CHF      s- s better  but still very weak    .MEDICATIONS  (STANDING):  acetaminophen  IVPB .. 1000 milliGRAM(s) IV Intermittent once  amiodarone    Tablet 200 milliGRAM(s) Oral daily  aspirin  chewable 81 milliGRAM(s) Oral daily  enoxaparin Injectable 30 milliGRAM(s) SubCutaneous daily  ergocalciferol 16255 Unit(s) Oral <User Schedule>  furosemide   Injectable 40 milliGRAM(s) IV Push every 12 hours  insulin lispro (HumaLOG) corrective regimen sliding scale   SubCutaneous three times a day before meals  levothyroxine 25 MICROGram(s) Oral daily  methylPREDNISolone acetate Injectable 40 milliGRAM(s) IntraArticular once  metoprolol tartrate 12.5 milliGRAM(s) Oral two times a day  PARoxetine 10 milliGRAM(s) Oral daily    MEDICATIONS  (PRN):  acetaminophen   Tablet .. 650 milliGRAM(s) Oral every 6 hours PRN Temp greater or equal to 38C (100.4F), Moderate Pain (4 - 6)    __________________________________________________  REVIEW OF SYSTEMS:    CONSTITUTIONAL: No fever,   EYES: no acute visual disturbances  NECK: No pain or stiffness  RESPIRATORY: No cough; No shortness of breath  CARDIOVASCULAR: No chest pain, no palpitations  GASTROINTESTINAL: No pain. No nausea or vomiting; No diarrhea   NEUROLOGICAL: No headache or numbness, no tremors  MUSCULOSKELETAL: No joint pain, no muscle pain  GENITOURINARY: no dysuria, no frequency, no hesitancy  PSYCHIATRY: no depression , no anxiety  ALL OTHER  ROS negative      .Vital Signs Last 24 Hrs  T(C): 36.4 (2018 07:29), Max: 36.5 (2018 19:48)  T(F): 97.6 (2018 07:29), Max: 97.7 (2018 19:48)  HR: 55 (2018 07:29) (52 - 106)  BP: 115/44 (2018 07:29) (97/76 - 145/81)  BP(mean): --  RR: 16 (2018 07:29) (16 - 18)  SpO2: 98% (2018 07:29) (90% - 100%)  ________________________________________________  PHYSICAL EXAM:  GENERAL: NAD  HEENT: Normocephalic;  conjunctivae and sclerae clear; moist mucous membranes;   NECK : supple  CHEST/LUNG: Clear to auscultation bilaterally with good air entry   HEART: S1 S2  regular; no murmurs, gallops or rubs  ABDOMEN: Soft, Nontender, Nondistended; Bowel sounds present  EXTREMITIES: no cyanosis; no edema; no calf tenderness  SKIN: warm and dry; no rash  NERVOUS SYSTEM:  Awake and alert;  ; no new deficits    _________________________________________________  LABS:                        10.5   11.0  )-----------( 262      ( 2018 07:37 )             35.2         145  |  109<H>  |  46<H>  ----------------------------<  97  4.1   |  28  |  1.24    Ca    8.5      2018 07:37                            LABS:                        11.0   9.3   )-----------( 294      ( 2018 15:17 )             36.8         148<H>  |  114<H>  |  44<H>  ----------------------------<  97  3.4<L>   |  22  |  1.58<H>    Ca    8.2<L>      2018 15:17  Mg     2.4         TPro  7.3  /  Alb  2.8<L>  /  TBili  1.6<H>  /  DBili  x   /  AST  31  /  ALT  22  /  AlkPhos  237<H>      PT/INR - ( 2018 15:17 )   PT: 13.8 sec;   INR: 1.24 ratio         PTT - ( 2018 15:17 )  PTT:28.6 sec  Urinalysis Basic - ( 2018 18:03 )    Color: Yellow / Appearance: Clear / S.020 / pH: x  Gluc: x / Ketone: Negative  / Bili: Negative / Urobili: 4   Blood: x / Protein: 15 / Nitrite: Negative   Leuk Esterase: Trace / RBC: 2-5 /HPF / WBC 6-10 /HPF   Sq Epi: x / Non Sq Epi: Few /HPF / Bacteria: Many /HPF      CAPILLARY BLOOD GLUCOSE      POCT Blood Glucose.: 120 mg/dL (2018 14:08)    < from: US Duplex Venous Lower Ext Ltd, Right (18 @ 20:14) >  IMPRESSION:     No evidence of right lower extremity deep venous thrombosis.        < end of copied text >

## 2018-11-26 NOTE — PROGRESS NOTE ADULT - REASON FOR ADMISSION
FTT , CHF

## 2018-11-26 NOTE — PROGRESS NOTE ADULT - PROBLEM SELECTOR PROBLEM 2
CHF exacerbation

## 2018-11-26 NOTE — PROGRESS NOTE ADULT - PROBLEM SELECTOR PLAN 2
-Patient presented with Poor PO in take, but denies chest pain or SOB  -CXR looks congested with elevated pro BNP, repeat xray shows improvement   - on Lasix 40 IV BID as patient takes 80 mg po daily at Home,   - repeat echocardiogram shows Right ventricular enlargement with decreased RV systolic function (TAPSE 1.3cm) with EF 10%  -daily weights  -strict I/Os
-Patient presented with Poor PO in take, but denies chest pain or SOB  -CXR looks congested with elevated pro BNP.   - on Lasix 40 IV BID as patient takes 80 mg po daily at Home, discussed with Dr Carrillo  -f/u repeat echocardiogram   -daily weights  -strict I/Os
iv lasix  low ef   cardilogy on case
iv lasxix  very low ef
-Patient presented with Poor PO in take, but denies chest pain or SOB  -CXR looks congested with elevated pro BNP, repeat xray shows improvement   - on Lasix 40 IV BID as patient takes 80 mg po daily at Home,   - repeat echocardiogram shows Right ventricular enlargement with decreased RV systolic function (TAPSE 1.3cm) with EF 10%  -daily weights  -strict I/Os
-Patient presented with Poor PO in take, but denies chest pain or SOB  -CXR looks congested with elevated pro BNP, repeat xray shows improvement   - on Lasix 40 IV BID as patient takes 80 mg po daily at Home,   - repeat echocardiogram shows Right ventricular enlargement with decreased RV systolic function (TAPSE 1.3cm) with EF 10%  -daily weights  -strict I/Os

## 2018-11-26 NOTE — PROGRESS NOTE ADULT - PROBLEM SELECTOR PLAN 1
-Patient presented with poor PO in take, inability to walk  -likely 2/2 UTI  - CT head normal  -Patient's UA is +ve, elevated neutrophils    - on Rocephin D3   -UCx shows E.coli  - FU sensitivity
-Patient presented with poor PO in take, inability to walk  -likely 2/2 UTI  - CT head normal  -Patient's UA is +ve, elevated neutrophils    - on Rocephin D3   -UCx shows E.coli  - FU sensitivity
-Patient presented with poor PO in take, inability to walk  -likely 2/2 UTI  - CT head normal  -Patient's UA is +ve, elevated neutrophils    - on Rocephin D2 empirically until blood and urine culture returns
-Patient presented with poor PO in take, inability to walk  -likely 2/2 UTI  - CT head normal  -Patient's UA is +ve, elevated neutrophils    - on Rocephin D3   -UCx shows E.coli  - FU sensitivity
-Patient presented with poor PO in take, inability to walk  -likely 2/2 UTI  - CT head normal  -Patient's UA is +ve, elevated neutrophils    - on Rocephin D5  -UCx shows E.coli  -PT recommended RUBEN
-Patient presented with poor PO in take, inability to walk  -likely 2/2 UTI  - CT head normal  -Patient's UA is +ve, elevated neutrophils    - on Rocephin D6  -UCx shows E.coli  -PT recommended RUBEN
-Patient presented with poor PO in take, inability to walk  -likely 2/2 UTI  - CT head normal  -Patient's UA is +ve, elevated neutrophils    - on Rocephin D6  -UCx shows E.coli  -PT recommended RUBEN
assited feed
assited feed
-Patient presented with poor PO in take, inability to walk  -likely 2/2 UTI  - CT head normal  -Patient's UA is +ve, elevated neutrophils    - on Rocephin D4  -UCx shows E.coli  -PT recommended RUBEN
-Patient presented with poor PO in take, inability to walk  -likely 2/2 UTI  - CT head normal  -Patient's UA is +ve, elevated neutrophils    - on Rocephin D5  -UCx shows E.coli  -PT recommended RUBEN

## 2018-11-26 NOTE — PROGRESS NOTE ADULT - PROBLEM SELECTOR PROBLEM 1
Failure to thrive in adult

## 2019-09-10 NOTE — ED ADULT TRIAGE NOTE - ARRIVAL INFO ADDITIONAL COMMENTS
Case Management Assessment  Initial Evaluation    Date/Time of Evaluation: 9/9/2019 2:14 PM  Assessment Completed by: Marbin Peraza    Patient Name: Austin Back  YOB: 1941  Diagnosis: Acute encephalopathy [G93.40]  Date / Time: 9/7/2019 11:06 AM  Admission status/Date:INPT 9/7/19  Chart Reviewed: Yes      Patient Interviewed: Yes   Family Interviewed:  Yes - Juliano Santiago      Hospitalization in the last 30 days:  No    Contacts  :     Relationship to Patient:   Phone Number:    Alternate Contact:     Relationship to Patient:     Phone Number:    Met with:    Current PCP [address] [place] [address], [address]  Medicare  Precert required for SNF : Franci Deng        3 night stay required - Y, N    ADLS  Support Systems: Children  Transportation: family    Meal Preparation: self    Housing  Home Environment: home alone  Steps: n/a  Plans to Return to Present Housing: STR  Other Identified Issues: poss LTC needs per pt family    Joe Padron  Currently active with [facility] : Yes - pt unsure of agency name  Type of Home Care Services: Skilled Therapy, Nursing Services  Passport/Waiver : No  :                      Phone Number:    Passport/Waiver Services: - [facility] Provider: n/a  Equipment: Walker__Cane_X_RTS__ BSC__Shower Chair__  02__ HHN__ CPAP__  BiPap__  Hospital Bed__ W/C___ Other___ROLLATOR_______      Has Home O2 in place on admit:  No  Informed of need to bring portable home O2 tank on day of discharge for nursing to connect prior to leaving:   Not Indicated  Verbalized agreement/Understanding:   Not Indicated    Community Service Affiliation  Dialysis:  No    · Name:  · Location  · Dialysis Schedule:  · Phone:   · Fax:     Outpatient PT/OT: No    Cancer Center: No     CHF Clinic: No     Pulmonary Rehab: No  Pain Clinic: No  Community Mental Health: No    Wound Clinic: No     Other: -    DISCHARGE PLAN: reviewed chart and met INTERDISCIPLINARY PLAN OF CARE CONFERENCE    Date/Time: 9/10/2019 3:28 PM  Completed by: Violette Osei Case Management      Patient Name:  Marlyn Brothers  YOB: 1941  Admitting Diagnosis: Acute encephalopathy [G93.40]     Admit Date/Time:  9/7/2019 11:06 AM    Chart reviewed. Interdisciplinary team met to discuss patient progress and discharge plans. Disciplines included Case Management, Nursing, and Dietitian. Current Status: Stable    PT/OT recommendation: SNF    Anticipated Discharge Date: TBD    Expected D/C Disposition:  Rehab  Confirmed plan with patient and/or family Yes-jaiden  Discharge Plan Comments:  Reviewed chart and spoke with jaiden re: rehab facilities. Gave jaiden request facilities numbers. Jaiden will call her selection and return call to writer with choices. Noted jaiden unable to come in to get list of facilities. Will cont to follow.        Home O2 in place on admit: No  Pt informed of need to bring portable home O2 tank on day of discharge for nursing to connect prior to leaving:  Not Indicated  Verbalized agreement/Understanding:  Not Indicated Review  Medication Review  Do you have all of your prescriptions and are they filled?:  Yes   Barriers to Medication Adherence:  None  Are you able to afford your medications?:  Yes  How often do you have difficulty taking your medications?:  I always take them as prescribed. Housing Review  Who do you live with?:  Alone  Are you an active caregiver in your home?:  No  Social Support  Do you have a [address]?:  Yes  [facility] Name:  Li De La Rosa of [address]  Patient DME:  Jerry Sher, Straight cane, Other, Shower chair  Other Patient DME:  3in1 commode, grab bars in shower and around toilet, reacher, rollator   Functional Review  Ability to seek help/take action for Emergent/Urgent situations i.e. fire, crime, inclement weather or health crisis. :  Independent  Ability handle personal hygiene needs (bathing/dressing/grooming):  Needs Assistance  Ability to manage medications:  Needs Assistance  Ability to prepare food:  Needs Assistance  Ability to maintain home (clean home, laundry):  Needs Assistance  Ability to drive and/or has transportation:  Dependent  Ability to do shopping:  Needs Assistance  Ability to manage finances: Independent  Hearing and Vision  Visual Impairment:  Visual impairment (Glasses/contacts)  Hearing Impairment:  Hard of hearing  Care Transitions Interventions     Other Services:   (Comment: food pantry information )          Follow Up  Future Appointments   Date Time Provider Ann Torre   10/15/2019  1:00 PM [facility]   12/18/2019  1:00 PM KIMI Moreno CNP [facility]       Health Maintenance  There are no preventive care reminders to display for this patient.     Shellie Wyman RN EMS REPORTS PT TOOK NITRO X 2,

## 2020-04-22 NOTE — H&P ADULT. - ADMIT DATE
04-Jan-2017 Xolair Pregnancy And Lactation Text: This medication is Pregnancy Category B and is considered safe during pregnancy. This medication is excreted in breast milk.

## 2021-06-25 NOTE — ED PROVIDER NOTE - HISTORY ATTESTATION, MLM
Outpatient IV Medication Monitoring     Johnathon Deepika Kowalski and communicated to [facility]. Phone: [phone] Fax (pharmacy): [phone].     Johnathon will return to clinic for labs and evaluation on Monday 6/28.     Pharmacy will continue to follow.  Gilda Keane, DrakeD   I have reviewed and confirmed nurses' notes...

## 2022-01-24 NOTE — H&P ADULT - NSHPSOURCEINFORD_GEN_ALL_CORE
1/24/2022         RE: Alis Hartman  [address]  [place] [address]        Dear Colleague,    Thank you for referring your patient, Alis Hartman, to the [facility]. Please see a copy of my visit note below.    Gynecologic Oncology Clinic - Established Patient Visit    Visit date: Jan 24, 2022     CC: vaginal bleeding    Interval history: Alis Hartman is a 68 year old who has a history of cervix cancer treated with primary radiation who also has a history of VAIN III treated with laser. Her last laser ablation was in 6/2021.    She continues to have dysuria. She uses pyridium for this when needed. She also has vaginal dryness. She did not pursue hyperbaric treatment due to the difficulties with transportation to the [facility] which would be necessary for that therapy. She has no vaginal bleeding. No pain. No other discharge. She is not using any type of vaginal or vulvar cream/ointment, but she has used estradiol in the past and felt this was helpful at that time.     Relevant history:  3/29/96:  Cervix biopsy:  Moderately differentiated squamous cell carcinoma. She was treated with primary radiation therapy, unsure if she also had chemotherapy or not.  This treatment was at [facility].    10/26/11:  EUA colposcopy and biopsy                 Pathology: Cervix biopsy:  No dysplasia.  Uterus left apex:  Stroma hyalinization c/w irradiation.  Vagina upper biopsy: no dysplasia  8/12/13:  Upper vaginal wall:  VAIN1  12/22/14:  EUA, CO2 laser to the vagina, colposcopy of vagina                 Pathology:  VAIN3 at 3 o'clock  12/27/18:  Pap:  HSIL, HPV+  5/24/19: PROCEDURES: Vaginal colposcopy, vaginal biopsy, CO2 laser of the vagina  VAGINAL BIOPSY:   - Detached epithelium with high grade squamous intraepithelial lesion (vaginal intraepithelial neoplasia 3)   10/14/19: Pap HSIL/other HPV+  2/28/2020: biopsy of vagina Vain III; MRI recommended prior to OR;  Child however, patient did not complete this  3/12/2020: EUA, biopsies, LASER: VAIN III  9/2020: Due for Pap smear with HPV-testing, patient cancelled appointment  1/2021: Vaginal colposcopy with no obvious areas of abnormality/lesions  6/8/2021: Exam under anesthesia, vaginal biopsies, laser ablation of the upper vagina with Dr. Perez, biopsies returned showing necrosis. Hyperbaric oxygen therapy discussed and referral place. Patient elected not to proceed with consult.    Review of Systems:  As per HPI, otherwise non-contributory.     Past Medical History:  Past Medical History:   Diagnosis Date     Depression      Hypertension      Malignant neoplasm of endocervix (H)     Tx with radiation     Other chronic pain     Low back     Urinary incontinence        Past Surgical History:  Past Surgical History:   Procedure Laterality Date     ABDOMINOPLASTY       BIOPSY CERVICAL, LOCAL EXCISION, SINGLE/MULTIPLE  10/26/2011    Procedure:BIOPSY CERVICAL, LOCAL EXCISION, SINGLE/MULTIPLE; EUA, cervical biopsies; Surgeon:BETTY TINEO; Location:[facility] OR     BIOPSY VAGINAL N/A 6/8/2021    Procedure: BIOPSY, VAGINA;  Surgeon: Dany Perez MD;  Location: [facility] OR     EXAM UNDER ANESTHESIA PELVIC N/A 3/12/2020    Procedure: Exam under anesthsia, vaginal biopsies, possible CO2 laser of the vagina;  Surgeon: Lilliam Roy MD;  [facility]: [facility] OR     GI SURGERY  2004    gastric bypass     LASER CO2 VAGINA N/A 3/2/2015    Procedure: LASER CO2 VAGINA;  Surgeon: Mariela Abdalla MD;  Location: [facility] OR     LASER CO2 VAGINA N/A 5/24/2019    Procedure: Exam under anesthesia, vaginal biopsies, CO2 laser of the vagina;  Surgeon: Lilliam Roy MD;  Location: [facility] OR     LASER CO2 VAGINA N/A 6/8/2021    Procedure: Exam under anesthesia, laser ablation of the upper vagina;  Surgeon: Dany Perez MD;  Location: [facility] OR        Physical Exam:  BP (!) 175/91 (BP Location: Right arm)   Pulse 69   Temp 98.5  F (36.9  C) (Oral)    Resp 18   Wt 101.2 kg (223 lb 3.2 oz)   SpO2 99%   BMI 39.54 kg/m     General appearance: no acute distress, well groomed, sitting comfortably   :  External: vulva/labia overall normal in appearance, some lichenification of the labia majora bilaterally  Vagina: significant pallor of the mucosa extending out to the keratinized epithelium, there is some telangiectasias surrounding the urethra however, overall there is significant atrophy of the mucosal surfaces, within the vagina there is pallor of the skin and there is scarring with agglutiation of the upper vagina. No bleeding noted until further opening of the speculum, then some bleeding at right vaginal apex  Cervix: Unable to visualize due to agglutination of the upper vagina  Uterus/adnexa: not palpable due to stenosis of the vagina, there is no nodularity noted    Acetic acid was applied to the vagina and the area examined with no areas of concern noted.       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    Plan:  - No lesions on exam today. Return in 6 months.     - Vaginal burning and irritation: Likely a combination of atrophy and radiation fibrosis. Estradiol cream prescribed.     I spent a total of 20 minutes on the care of Alis Hartman on the day of service including 15 minutes face to face time and the remainder in chart review, care coordination, and documentation on the day of service.     Dany Perez MD     Gynecologic Oncology       Again, thank you for allowing me to participate in the care of your patient.        Sincerely,        Dany Perez MD

## 2022-01-27 NOTE — PROGRESS NOTE ADULT - ATTENDING COMMENTS
Patient was seen and examined,interim events noted,labs and radiology studies reviewed.  Rivas Carrillo MD,FACC.  [address] [place] [address].  [facility].  [phone]
monitor electrolytes   pt ot  ortho for rt knee swelling
long term prognosis   d/c planning with palliative/ hospice care in [place]
PT OT   D/C PLANNING
given the end stage heart failure with ef of 10%   prognosis very poor   palliative eval noted
cardiology follow up
long term prognosis poor  possible d/c on Monday
Patient was seen and examined,interim events noted,labs and radiology studies reviewed.  Rivas Carrillo MD,FACC.  [address] [place] [address].  [facility].  [phone]
none

## 2022-06-15 NOTE — ED ADULT NURSE NOTE - NSSISCREENINGQ5_ED_A_ED
A (Catheter Nc Trek 3mm 15mm Rx Radopq Smth Rnd Tip) balloon was inflated in the right coronary artery and was removed in tact.     The balloon was inflated at 12 mala for 5 seconds at 6/15/2022 7:18 PM.  The balloon was used for 2nd inflation at 12 mala for 15 seconds.   No

## 2023-11-20 NOTE — ED PROVIDER NOTE - CONSTITUTIONAL NOURISHMENT, MLM
Called and let patient know that she needs iv iron based on labs. Patient verbalized understanding.  Advised office will call with appointments.   
well

## 2024-06-18 NOTE — H&P ADULT - NSHPPHYSICALEXAM_GEN_ALL_CORE
Pt S/P Lab appendectomy  on 6/10/24    Attempted to contact pt regarding below pathology results, left voicemail with phone number [phone] for pt to return my call.     Path results: Pathology with acute appendicitis with abscess, as expected.         PHYSICAL EXAM:  GENERAL: NAD, slightly lethargic   HEENT: Normocephalic;  conjunctivae and sclerae clear; moist mucous membranes;   NECK : supple  CHEST/LUNG: Clear to auscultation bilaterally , but inspiratory effort is poor.   HEART: S1 S2  regular; no murmurs, gallops or rubs  ABDOMEN: Soft, Nontender, Nondistended; Bowel sounds present  EXTREMITIES: right lower extremity, edematous, No pitting edema ,bruising on bilateral lower extremities   dry; no rash  NERVOUS SYSTEM:  Awake and alert; Oriented to self only

## 2024-08-26 NOTE — DIETITIAN INITIAL EVALUATION ADULT. - NS AS NUTRI INTERV FEED ASSISTANCE
Pt name and  verified   Injection given  Pt tolerated well and exited     Provide food choices within diet Rx as available/updated/Meal set -up

## 2024-11-29 NOTE — ED PROVIDER NOTE - PSH
Bed: 41  Expected date:   Expected time:   Means of arrival:   Comments:   No significant past surgical history    No significant past surgical history

## 2025-04-03 NOTE — PATIENT PROFILE ADULT - DO YOU FEEL LIKE HURTING YOURSELF OR OTHERS?
Patient and wife presented to office for Home BP machine check to  confirm accuracy of device. Patient wife voiced concern over diastolic readings at home. Home BP log obtained and placed in NP folder for Review.     Patient uses a [employer] home BP machine, purchased about 4 months ago.     After Resting for 5 minutes:    ARM BP HR   Home Machine R 140/103 98   Our Machine R 139/97 103   Manual R 138/94              BP education provided on placement of BP cuff away from Elbow, resting for 15 minutes, and taking BP 2-3 hours after medication. They have been taking his BP after dinner in the evening, but will start taking it in the morning after he takes his medications.     Home machine is reading similar in comparison to office readings.    Patient verbalized understanding of education and recommendations provided during office visit. Confirmed upcoming visit with patient and wife.     
no

## 2025-05-26 NOTE — DIETITIAN INITIAL EVALUATION ADULT. - +GENDER
Universal ankle splint applied to patients right lower extremity. Pt educated on circulation.  Pt verbalized understanding.        DME signed and sent for scanning.    Statement Selected